# Patient Record
Sex: FEMALE | Race: AMERICAN INDIAN OR ALASKA NATIVE | NOT HISPANIC OR LATINO | Employment: UNEMPLOYED | URBAN - METROPOLITAN AREA
[De-identification: names, ages, dates, MRNs, and addresses within clinical notes are randomized per-mention and may not be internally consistent; named-entity substitution may affect disease eponyms.]

---

## 2018-10-19 ENCOUNTER — HOSPITAL ENCOUNTER (OUTPATIENT)
Dept: RADIOLOGY | Facility: HOSPITAL | Age: 43
Discharge: HOME | End: 2018-10-19
Attending: INTERNAL MEDICINE
Payer: MEDICAID

## 2018-10-19 ENCOUNTER — HOSPITAL ENCOUNTER (OUTPATIENT)
Dept: RADIOLOGY | Facility: HOSPITAL | Age: 43
Discharge: HOME | End: 2018-10-19
Payer: MEDICAID

## 2018-10-19 DIAGNOSIS — Z12.39 SCREENING BREAST EXAMINATION: Primary | ICD-10-CM

## 2018-10-19 DIAGNOSIS — R05.9 COUGH: Primary | ICD-10-CM

## 2018-10-19 DIAGNOSIS — R05.9 COUGH: ICD-10-CM

## 2018-10-19 PROCEDURE — 71046 X-RAY EXAM CHEST 2 VIEWS: CPT | Mod: 26,,, | Performed by: RADIOLOGY

## 2018-10-19 PROCEDURE — 71046 X-RAY EXAM CHEST 2 VIEWS: CPT | Mod: TC,FY

## 2018-10-19 PROCEDURE — 70220 X-RAY EXAM OF SINUSES: CPT | Mod: 26,,, | Performed by: RADIOLOGY

## 2018-10-19 PROCEDURE — 70220 X-RAY EXAM OF SINUSES: CPT | Mod: TC,FY

## 2018-10-24 ENCOUNTER — HOSPITAL ENCOUNTER (OUTPATIENT)
Dept: RADIOLOGY | Facility: HOSPITAL | Age: 43
Discharge: HOME | End: 2018-10-24
Payer: MEDICAID

## 2018-10-24 DIAGNOSIS — Z12.39 SCREENING BREAST EXAMINATION: ICD-10-CM

## 2018-10-24 PROCEDURE — 77063 BREAST TOMOSYNTHESIS BI: CPT | Mod: 26,,, | Performed by: RADIOLOGY

## 2018-10-24 PROCEDURE — 77063 BREAST TOMOSYNTHESIS BI: CPT | Mod: TC

## 2018-10-24 PROCEDURE — 77067 SCR MAMMO BI INCL CAD: CPT | Mod: 26,,, | Performed by: RADIOLOGY

## 2018-10-24 PROCEDURE — 77067 SCR MAMMO BI INCL CAD: CPT | Mod: TC

## 2018-11-12 ENCOUNTER — OFFICE VISIT (OUTPATIENT)
Dept: OBSTETRICS AND GYNECOLOGY | Facility: CLINIC | Age: 43
End: 2018-11-12
Payer: MEDICAID

## 2018-11-12 VITALS
HEIGHT: 65 IN | BODY MASS INDEX: 24.94 KG/M2 | SYSTOLIC BLOOD PRESSURE: 98 MMHG | WEIGHT: 149.69 LBS | DIASTOLIC BLOOD PRESSURE: 70 MMHG

## 2018-11-12 DIAGNOSIS — Z01.419 ENCOUNTER FOR GYNECOLOGICAL EXAMINATION WITHOUT ABNORMAL FINDING: Primary | ICD-10-CM

## 2018-11-12 DIAGNOSIS — Z12.4 CERVICAL CANCER SCREENING: ICD-10-CM

## 2018-11-12 PROCEDURE — 99999 PR PBB SHADOW E&M-EST. PATIENT-LVL III: CPT | Mod: PBBFAC,,, | Performed by: OBSTETRICS & GYNECOLOGY

## 2018-11-12 PROCEDURE — 99213 OFFICE O/P EST LOW 20 MIN: CPT | Mod: PBBFAC,PO | Performed by: OBSTETRICS & GYNECOLOGY

## 2018-11-12 PROCEDURE — 87624 HPV HI-RISK TYP POOLED RSLT: CPT

## 2018-11-12 PROCEDURE — 87491 CHLMYD TRACH DNA AMP PROBE: CPT

## 2018-11-12 PROCEDURE — 99386 PREV VISIT NEW AGE 40-64: CPT | Mod: S$PBB,,, | Performed by: OBSTETRICS & GYNECOLOGY

## 2018-11-12 PROCEDURE — 88175 CYTOPATH C/V AUTO FLUID REDO: CPT

## 2018-11-12 RX ORDER — MONTELUKAST SODIUM 10 MG/1
TABLET ORAL
Refills: 2 | COMMUNITY
Start: 2018-10-19 | End: 2022-04-06

## 2018-11-12 RX ORDER — FLUTICASONE PROPIONATE 50 MCG
SPRAY, SUSPENSION (ML) NASAL
Refills: 2 | COMMUNITY
Start: 2018-10-19 | End: 2022-04-06

## 2018-11-12 NOTE — LETTER
November 12, 2018      Herbert Abdi MD  [address] [facility] [place] [address]           [place] [facility]  [address], [address]  5th Floor [facility]  [place] [address]  Phone: [phone]          Patient: Wilmar Holcomb   MR Number: [id number]   YOB: 1975   Date of Visit: 11/12/2018       Dear Dr. Herbert Abdi:    Thank you for referring Wilmar Holcomb to me for evaluation. Attached you will find relevant portions of my assessment and plan of care.    If you have questions, please do not hesitate to call me. I look forward to following Wilmar Holcomb along with you.    Sincerely,    Desiree Cunningham MD    Enclosure  CC:  No Recipients    If you would like to receive this communication electronically, please contact [email or web address] or [phone] to request more information on [employer] access.    For providers and/or their staff who would like to refer a patient to [facility], please contact us through our one-stop-shop provider referral line, [facility], at [phone].    If you feel you have received this communication in error or would no longer like to receive these types of communications, please e-mail [email or web address]

## 2018-11-12 NOTE — PROGRESS NOTES
"44 yo now  female who presents for routine gyn visit.  Reports that her cycles come q month. Duration 4-5 days. No problems.  . Ok with STD testing today.  No h/o abl pap smears.  No h/o mammograms.  Has used OCPs in the past. They caused weight gain. Declines contraception today.    ROS:  GENERAL: Denies weight gain or weight loss. Feeling well overall.   SKIN: Denies rash or lesions.   CHEST: Denies chest pain or shortness of breath.   CARDIOVASCULAR: Denies palpitations or left sided chest pain.   ABDOMEN: No abdominal pain, constipation, diarrhea, nausea, vomiting or rectal bleeding.   URINARY: No frequency, dysuria, hematuria, or burning on urination.  REPRODUCTIVE: no complaints  BREASTS: denies pain, lumps, or nipple discharge.   HEMATOLOGIC: No easy bruisability or excessive bleeding.   MUSCULOSKELETAL: Denies joint pain or swelling.   NEUROLOGIC: Denies syncope or weakness.   PSYCHIATRIC: Denies depression, anxiety or mood swings.         PE:   Vitals: BP 98/70   Ht 5' 5" (1.651 m)   Wt 67.9 kg (149 lb 11.1 oz)   LMP 10/24/2018   BMI 24.91 kg/m²   APPEARANCE: Well nourished, well developed, in no acute distress.  SKIN: Normal skin turgor, no lesions.  CHEST: Lungs clear to auscultation.  HEART: Regular rate and rhythm, no murmurs, rubs or gallops.  ABDOMEN: Soft. No tenderness or masses. No hepatosplenomegaly. No hernias.  BREASTS: Symmetrical, no skin changes or visible lesions. No palpable masses, nipple discharge or adenopathy bilaterally.  PELVIC: Normal external female genitalia without lesions. Normal hair distribution. Adequate perineal body, normal urethral meatus. Vagina moist and well rugated without lesions or discharge. Cervix pink and without lesions. No significant cystocele or rectocele. Bimanual exam showed uterus normal size, shape, retroverted, mobile and nontender. Adnexa without masses or tenderness. Urethra and bladder normal.  EXTREMITIES: No clubbing cyanosis or " edema.    AP  Routine gyn  -s/p normal breast exam: mammogram uptodate  -s/p normal pelvic exam:   -Pap and HPV: collected  -STD testing: gc/chl and HIV ordered  -contraception: declined        EBENA Cunningham MD

## 2018-11-14 LAB
C TRACH DNA SPEC QL NAA+PROBE: NOT DETECTED
N GONORRHOEA DNA SPEC QL NAA+PROBE: NOT DETECTED

## 2018-11-16 LAB
HPV HR 12 DNA CVX QL NAA+PROBE: NEGATIVE
HPV16 AG SPEC QL: NEGATIVE
HPV18 DNA SPEC QL NAA+PROBE: NEGATIVE

## 2019-11-08 DIAGNOSIS — Z12.31 VISIT FOR SCREENING MAMMOGRAM: Primary | ICD-10-CM

## 2019-11-11 ENCOUNTER — IMMUNIZATION (OUTPATIENT)
Dept: PHARMACY | Facility: CLINIC | Age: 44
End: 2019-11-11
Payer: MEDICAID

## 2019-11-29 ENCOUNTER — HOSPITAL ENCOUNTER (OUTPATIENT)
Dept: RADIOLOGY | Facility: HOSPITAL | Age: 44
Discharge: HOME | End: 2019-11-29
Attending: INTERNAL MEDICINE
Payer: MEDICAID

## 2019-11-29 DIAGNOSIS — Z12.31 VISIT FOR SCREENING MAMMOGRAM: ICD-10-CM

## 2019-11-29 PROCEDURE — 77067 SCR MAMMO BI INCL CAD: CPT | Mod: TC

## 2019-11-29 PROCEDURE — 77067 MAMMO DIGITAL SCREENING BILAT WITH TOMOSYNTHESIS_CAD: ICD-10-PCS | Mod: 26,,, | Performed by: RADIOLOGY

## 2019-11-29 PROCEDURE — 77063 MAMMO DIGITAL SCREENING BILAT WITH TOMOSYNTHESIS_CAD: ICD-10-PCS | Mod: 26,,, | Performed by: RADIOLOGY

## 2019-11-29 PROCEDURE — 77063 BREAST TOMOSYNTHESIS BI: CPT | Mod: TC

## 2019-11-29 PROCEDURE — 77063 BREAST TOMOSYNTHESIS BI: CPT | Mod: 26,,, | Performed by: RADIOLOGY

## 2019-11-29 PROCEDURE — 77067 SCR MAMMO BI INCL CAD: CPT | Mod: 26,,, | Performed by: RADIOLOGY

## 2020-12-10 ENCOUNTER — HOSPITAL ENCOUNTER (OUTPATIENT)
Dept: RADIOLOGY | Facility: HOSPITAL | Age: 45
Discharge: HOME | End: 2020-12-10
Attending: INTERNAL MEDICINE
Payer: MEDICAID

## 2020-12-10 DIAGNOSIS — Z12.31 SCREENING MAMMOGRAM, ENCOUNTER FOR: ICD-10-CM

## 2020-12-10 DIAGNOSIS — R05.9 COUGH: ICD-10-CM

## 2020-12-10 DIAGNOSIS — R05.9 COUGH: Primary | ICD-10-CM

## 2020-12-10 PROCEDURE — 70220 X-RAY EXAM OF SINUSES: CPT | Mod: 26,,, | Performed by: RADIOLOGY

## 2020-12-10 PROCEDURE — 70220 X-RAY EXAM OF SINUSES: CPT | Mod: TC,FY

## 2020-12-10 PROCEDURE — 70220 XR SINUSES MIN 3 VIEWS: ICD-10-PCS | Mod: 26,,, | Performed by: RADIOLOGY

## 2020-12-23 ENCOUNTER — HOSPITAL ENCOUNTER (OUTPATIENT)
Dept: RADIOLOGY | Facility: HOSPITAL | Age: 45
Discharge: HOME | End: 2020-12-23
Attending: INTERNAL MEDICINE
Payer: MEDICAID

## 2020-12-23 DIAGNOSIS — Z12.31 SCREENING MAMMOGRAM, ENCOUNTER FOR: ICD-10-CM

## 2020-12-23 PROCEDURE — 77067 MAMMO DIGITAL SCREENING BILAT WITH TOMO: ICD-10-PCS | Mod: 26,,, | Performed by: RADIOLOGY

## 2020-12-23 PROCEDURE — 77067 SCR MAMMO BI INCL CAD: CPT | Mod: TC

## 2020-12-23 PROCEDURE — 77063 MAMMO DIGITAL SCREENING BILAT WITH TOMO: ICD-10-PCS | Mod: 26,,, | Performed by: RADIOLOGY

## 2020-12-23 PROCEDURE — 77063 BREAST TOMOSYNTHESIS BI: CPT | Mod: 26,,, | Performed by: RADIOLOGY

## 2020-12-23 PROCEDURE — 77067 SCR MAMMO BI INCL CAD: CPT | Mod: 26,,, | Performed by: RADIOLOGY

## 2021-01-14 ENCOUNTER — OFFICE VISIT (OUTPATIENT)
Dept: GASTROENTEROLOGY | Facility: CLINIC | Age: 46
End: 2021-01-14
Payer: MEDICAID

## 2021-01-14 ENCOUNTER — TELEPHONE (OUTPATIENT)
Dept: GASTROENTEROLOGY | Facility: CLINIC | Age: 46
End: 2021-01-14

## 2021-01-14 VITALS
OXYGEN SATURATION: 99 % | WEIGHT: 160.19 LBS | TEMPERATURE: 98 F | RESPIRATION RATE: 16 BRPM | BODY MASS INDEX: 26.69 KG/M2 | SYSTOLIC BLOOD PRESSURE: 112 MMHG | HEIGHT: 65 IN | HEART RATE: 73 BPM | DIASTOLIC BLOOD PRESSURE: 60 MMHG

## 2021-01-14 DIAGNOSIS — K59.04 CHRONIC IDIOPATHIC CONSTIPATION: Primary | ICD-10-CM

## 2021-01-14 PROCEDURE — 99999 PR PBB SHADOW E&M-EST. PATIENT-LVL IV: ICD-10-PCS | Mod: PBBFAC,,, | Performed by: NURSE PRACTITIONER

## 2021-01-14 PROCEDURE — 99203 OFFICE O/P NEW LOW 30 MIN: CPT | Mod: S$PBB,,, | Performed by: NURSE PRACTITIONER

## 2021-01-14 PROCEDURE — 99214 OFFICE O/P EST MOD 30 MIN: CPT | Mod: PBBFAC,PO | Performed by: NURSE PRACTITIONER

## 2021-01-14 PROCEDURE — 99999 PR PBB SHADOW E&M-EST. PATIENT-LVL IV: CPT | Mod: PBBFAC,,, | Performed by: NURSE PRACTITIONER

## 2021-01-14 PROCEDURE — 99203 PR OFFICE/OUTPT VISIT, NEW, LEVL III, 30-44 MIN: ICD-10-PCS | Mod: S$PBB,,, | Performed by: NURSE PRACTITIONER

## 2021-01-14 RX ORDER — POLYETHYLENE GLYCOL 3350 17 G/17G
17 POWDER, FOR SOLUTION ORAL DAILY
Qty: 510 G | Refills: 11 | Status: SHIPPED | OUTPATIENT
Start: 2021-01-14

## 2021-02-15 ENCOUNTER — OFFICE VISIT (OUTPATIENT)
Dept: GASTROENTEROLOGY | Facility: CLINIC | Age: 46
End: 2021-02-15
Payer: MEDICAID

## 2021-02-15 VITALS
WEIGHT: 161.19 LBS | TEMPERATURE: 98 F | HEIGHT: 65 IN | OXYGEN SATURATION: 98 % | HEART RATE: 69 BPM | RESPIRATION RATE: 16 BRPM | BODY MASS INDEX: 26.85 KG/M2 | DIASTOLIC BLOOD PRESSURE: 60 MMHG | SYSTOLIC BLOOD PRESSURE: 100 MMHG

## 2021-02-15 DIAGNOSIS — Z12.11 SCREENING FOR MALIGNANT NEOPLASM OF COLON: ICD-10-CM

## 2021-02-15 DIAGNOSIS — K59.04 CHRONIC IDIOPATHIC CONSTIPATION: Primary | ICD-10-CM

## 2021-02-15 DIAGNOSIS — Z01.818 PREOPERATIVE EXAMINATION: ICD-10-CM

## 2021-02-15 PROCEDURE — 99214 OFFICE O/P EST MOD 30 MIN: CPT | Mod: S$PBB,,, | Performed by: NURSE PRACTITIONER

## 2021-02-15 PROCEDURE — 99214 OFFICE O/P EST MOD 30 MIN: CPT | Mod: PBBFAC,PO | Performed by: NURSE PRACTITIONER

## 2021-02-15 PROCEDURE — 99999 PR PBB SHADOW E&M-EST. PATIENT-LVL IV: CPT | Mod: PBBFAC,,, | Performed by: NURSE PRACTITIONER

## 2021-02-15 PROCEDURE — 99999 PR PBB SHADOW E&M-EST. PATIENT-LVL IV: ICD-10-PCS | Mod: PBBFAC,,, | Performed by: NURSE PRACTITIONER

## 2021-02-15 PROCEDURE — 99214 PR OFFICE/OUTPT VISIT, EST, LEVL IV, 30-39 MIN: ICD-10-PCS | Mod: S$PBB,,, | Performed by: NURSE PRACTITIONER

## 2021-02-15 RX ORDER — SODIUM, POTASSIUM,MAG SULFATES 17.5-3.13G
1 SOLUTION, RECONSTITUTED, ORAL ORAL DAILY
Qty: 1 KIT | Refills: 0 | Status: SHIPPED | OUTPATIENT
Start: 2021-02-15 | End: 2021-02-17

## 2021-03-01 ENCOUNTER — LAB VISIT (OUTPATIENT)
Dept: FAMILY MEDICINE | Facility: CLINIC | Age: 46
End: 2021-03-01
Payer: MEDICAID

## 2021-03-01 DIAGNOSIS — Z01.818 PREOPERATIVE EXAMINATION: ICD-10-CM

## 2021-03-01 PROCEDURE — U0003 INFECTIOUS AGENT DETECTION BY NUCLEIC ACID (DNA OR RNA); SEVERE ACUTE RESPIRATORY SYNDROME CORONAVIRUS 2 (SARS-COV-2) (CORONAVIRUS DISEASE [COVID-19]), AMPLIFIED PROBE TECHNIQUE, MAKING USE OF HIGH THROUGHPUT TECHNOLOGIES AS DESCRIBED BY CMS-2020-01-R: HCPCS

## 2021-03-01 PROCEDURE — U0005 INFEC AGEN DETEC AMPLI PROBE: HCPCS

## 2021-03-02 LAB — SARS-COV-2 RNA RESP QL NAA+PROBE: NOT DETECTED

## 2021-03-04 PROBLEM — Z12.11 SCREEN FOR COLON CANCER: Status: ACTIVE | Noted: 2021-03-04

## 2021-07-09 ENCOUNTER — LAB VISIT (OUTPATIENT)
Dept: LAB | Facility: HOSPITAL | Age: 46
End: 2021-07-09
Payer: MEDICAID

## 2021-07-09 DIAGNOSIS — D64.9 ANEMIA, UNSPECIFIED: Primary | ICD-10-CM

## 2021-07-09 LAB
ABO + RH BLD: NORMAL
BLD GP AB SCN CELLS X3 SERPL QL: NORMAL

## 2021-07-09 PROCEDURE — 86900 BLOOD TYPING SEROLOGIC ABO: CPT | Performed by: INTERNAL MEDICINE

## 2021-07-09 PROCEDURE — 36415 COLL VENOUS BLD VENIPUNCTURE: CPT | Performed by: INTERNAL MEDICINE

## 2021-12-22 ENCOUNTER — HOSPITAL ENCOUNTER (OUTPATIENT)
Dept: RADIOLOGY | Facility: HOSPITAL | Age: 46
Discharge: HOME | End: 2021-12-22
Attending: INTERNAL MEDICINE
Payer: MEDICAID

## 2021-12-22 DIAGNOSIS — M79.641 RIGHT HAND PAIN: ICD-10-CM

## 2021-12-22 DIAGNOSIS — M79.641 RIGHT HAND PAIN: Primary | ICD-10-CM

## 2021-12-22 DIAGNOSIS — Z12.31 OTHER SCREENING MAMMOGRAM: Primary | ICD-10-CM

## 2021-12-22 PROCEDURE — 73130 XR HAND COMPLETE 3 VIEW RIGHT: ICD-10-PCS | Mod: 26,RT,, | Performed by: RADIOLOGY

## 2021-12-22 PROCEDURE — 73130 X-RAY EXAM OF HAND: CPT | Mod: TC,FY,RT

## 2021-12-22 PROCEDURE — 73130 X-RAY EXAM OF HAND: CPT | Mod: 26,RT,, | Performed by: RADIOLOGY

## 2022-02-02 ENCOUNTER — HOSPITAL ENCOUNTER (OUTPATIENT)
Dept: RADIOLOGY | Facility: HOSPITAL | Age: 47
Discharge: HOME | End: 2022-02-02
Attending: INTERNAL MEDICINE
Payer: MEDICAID

## 2022-02-02 DIAGNOSIS — Z12.31 OTHER SCREENING MAMMOGRAM: ICD-10-CM

## 2022-02-02 PROCEDURE — 77067 MAMMO DIGITAL SCREENING BILAT WITH TOMO: ICD-10-PCS | Mod: 26,,, | Performed by: RADIOLOGY

## 2022-02-02 PROCEDURE — 77067 SCR MAMMO BI INCL CAD: CPT | Mod: TC

## 2022-02-02 PROCEDURE — 77067 SCR MAMMO BI INCL CAD: CPT | Mod: 26,,, | Performed by: RADIOLOGY

## 2022-02-02 PROCEDURE — 77063 MAMMO DIGITAL SCREENING BILAT WITH TOMO: ICD-10-PCS | Mod: 26,,, | Performed by: RADIOLOGY

## 2022-02-02 PROCEDURE — 77063 BREAST TOMOSYNTHESIS BI: CPT | Mod: 26,,, | Performed by: RADIOLOGY

## 2022-03-17 ENCOUNTER — TELEPHONE (OUTPATIENT)
Dept: ORTHOPEDICS | Facility: CLINIC | Age: 47
End: 2022-03-17
Payer: MEDICAID

## 2022-03-17 DIAGNOSIS — M79.642 BILATERAL HAND PAIN: Primary | ICD-10-CM

## 2022-03-17 DIAGNOSIS — M79.641 BILATERAL HAND PAIN: Primary | ICD-10-CM

## 2022-03-17 NOTE — TELEPHONE ENCOUNTER
----- Message from Meera Mcbride PA-C sent at 3/17/2022  7:21 AM CDT -----  She has appt on Friday and needs bilateral hands XRs prior to seeing me.     Thanks.

## 2022-03-23 NOTE — PROGRESS NOTES
Subjective:      Patient ID: Wilmar Holcomb is a 47 y.o. female.    Chief Complaint: Pain of the Right Hand and Pain of the Left Hand      HPI  (Wilmer)    6 month history of intermittent bilateral hand pain/numbness/tingling that is worse at night and with lifting. She is right hand dominant. Right > left hand pain. She rates her pain as a 3 on a scale of 1-10. No alleviating factors. She feels like she has weakness in both hands.     No OT, surgery, injections, or bracing for her hands.       History reviewed. No pertinent past medical history.      Current Outpatient Medications:     polyethylene glycol (GLYCOLAX) 17 gram/dose powder, Take 17 g by mouth once daily., Disp: 510 g, Rfl: 11    Review of patient's allergies indicates:  No Known Allergies    Review of Systems   Constitutional: Negative for fever, malaise/fatigue, night sweats, weight gain and weight loss.   HENT: Negative for hearing loss, nosebleeds and odynophagia.    Eyes: Negative for blurred vision and double vision.   Cardiovascular: Negative for chest pain, irregular heartbeat and palpitations.   Respiratory: Negative for cough, hemoptysis, shortness of breath and wheezing.    Endocrine: Negative for cold intolerance and polydipsia.   Hematologic/Lymphatic: Does not bruise/bleed easily.   Skin: Negative for dry skin, poor wound healing, rash and suspicious lesions.   Musculoskeletal:        See HPI for pertinent positives.   Gastrointestinal: Negative for bloating, abdominal pain, constipation, diarrhea, hematochezia, melena, nausea and vomiting.   Genitourinary: Negative for bladder incontinence, dysuria, hematuria, hesitancy and incomplete emptying.   Neurological: Negative for disturbances in coordination, dizziness, focal weakness, headaches, loss of balance, numbness, paresthesias, seizures and weakness.   Psychiatric/Behavioral: Negative for depression and hallucinations. The patient is not nervous/anxious.          Objective:        Ht 5'  "5" (1.651 m)   Wt 68.5 kg (151 lb)   BMI 25.13 kg/m²     General    Vitals reviewed.  Constitutional: She is oriented to person, place, and time. She appears well-developed and well-nourished.   Pulmonary/Chest: Effort normal.   Abdominal: She exhibits no distension.   Neurological: She is alert and oriented to person, place, and time.   Psychiatric: She has a normal mood and affect. Her behavior is normal. Judgment and thought content normal.           RIGHT Hand/Wrist Examination:    Observation/Inspection:  Swelling  none    Deformity  none  Discoloration  none     Scars   none    Atrophy  none    HAND/WRIST EXAMINATION:  Finkelstein's Test   Neg  WHAT Test    Neg  Snuff box tenderness   Neg  Hook of Hamate Tenderness  Neg  CMC grind    Neg    Neurovascular Exam:  Digits WWP, brisk CR < 3s throughout  NVI motor/LTS to M/R/U nerves, radial pulse 2+  Tinel's Test - Carpal Tunnel  Neg  Tinel's Test - Cubital Tunnel  Neg  Phalen's Test    positive  Median Nerve Compression Test positive    ROM hand/wrist/elbow full, painless    She has mild swelling at DIP of index finger with mucous cyst.     Mild tenderness at IP joint of thumb.       LEFT Hand/Wrist Examination:    Observation/Inspection:  Swelling  none    Deformity  none  Discoloration  none     Scars   none    Atrophy  none    HAND/WRIST EXAMINATION:  Finkelstein's Test   Neg  WHAT Test    Neg  Snuff box tenderness   Neg  Hook of Hamate Tenderness  Neg  CMC grind    Neg    Neurovascular Exam:  Digits WWP, brisk CR < 3s throughout  NVI motor/LTS to M/R/U nerves, radial pulse 2+  Tinel's Test - Carpal Tunnel  Neg  Tinel's Test - Cubital Tunnel  Neg  Phalen's Test    positive  Median Nerve Compression Test positive    ROM hand/wrist/elbow full, painless    Mild tenderness IP joint of thumb.       XRAY INTERPRETATION:   X-rays of bilateral hands dated 3/23/22 are personally reviewed and show mild degeneration in IP joints, especially DIP of right index finger. "         Assessment:       Encounter Diagnoses   Name Primary?    Arthritis of hand     Numbness and tingling in both hands Yes          Plan:       Wilmar was seen today for pain and pain.    Diagnoses and all orders for this visit:    Numbness and tingling in both hands    Arthritis of hand      6 month history of intermittent bilateral hand pain/numbness/tingling that is worse at night and with lifting. She is right hand dominant. Right > left hand pain.     XRs show mild degeneration in IP joints, especially DIP of right index finger. She has mucous cyst at DIP joint of right index finger. Pain in fingers likely from underlying arthritis. Numbness/tingling/pain in hands likely from carpal tunnel.     Treatment options reviewed with patient along with above bilateral hand xrays. Following plan made:     - Given gel wrist brace x 2 to wear at night. Can wear during the day prn.   - Discussed trial of NSAID for arthritis. She wants to hold off.   - Discussed EMG/NCS, she wants to see how she does with braces first.   - Will call her in 4 weeks to check on her. If no improvement, then will get EMG/NCS.     Follow up if symptoms worsen or fail to improve.

## 2022-04-06 ENCOUNTER — OFFICE VISIT (OUTPATIENT)
Dept: ORTHOPEDICS | Facility: CLINIC | Age: 47
End: 2022-04-06
Payer: MEDICAID

## 2022-04-06 VITALS — BODY MASS INDEX: 25.16 KG/M2 | HEIGHT: 65 IN | WEIGHT: 151 LBS

## 2022-04-06 DIAGNOSIS — M19.049 ARTHRITIS OF HAND: ICD-10-CM

## 2022-04-06 DIAGNOSIS — R20.2 NUMBNESS AND TINGLING IN BOTH HANDS: Primary | ICD-10-CM

## 2022-04-06 DIAGNOSIS — R20.0 NUMBNESS AND TINGLING IN BOTH HANDS: Primary | ICD-10-CM

## 2022-04-06 PROCEDURE — 3008F PR BODY MASS INDEX (BMI) DOCUMENTED: ICD-10-PCS | Mod: CPTII,,, | Performed by: PHYSICIAN ASSISTANT

## 2022-04-06 PROCEDURE — 1160F RVW MEDS BY RX/DR IN RCRD: CPT | Mod: CPTII,,, | Performed by: PHYSICIAN ASSISTANT

## 2022-04-06 PROCEDURE — 1159F PR MEDICATION LIST DOCUMENTED IN MEDICAL RECORD: ICD-10-PCS | Mod: CPTII,,, | Performed by: PHYSICIAN ASSISTANT

## 2022-04-06 PROCEDURE — 99203 OFFICE O/P NEW LOW 30 MIN: CPT | Mod: S$PBB,,, | Performed by: PHYSICIAN ASSISTANT

## 2022-04-06 PROCEDURE — 3008F BODY MASS INDEX DOCD: CPT | Mod: CPTII,,, | Performed by: PHYSICIAN ASSISTANT

## 2022-04-06 PROCEDURE — 99203 PR OFFICE/OUTPT VISIT, NEW, LEVL III, 30-44 MIN: ICD-10-PCS | Mod: S$PBB,,, | Performed by: PHYSICIAN ASSISTANT

## 2022-04-06 PROCEDURE — 99213 OFFICE O/P EST LOW 20 MIN: CPT | Mod: PBBFAC,PN | Performed by: PHYSICIAN ASSISTANT

## 2022-04-06 PROCEDURE — 1160F PR REVIEW ALL MEDS BY PRESCRIBER/CLIN PHARMACIST DOCUMENTED: ICD-10-PCS | Mod: CPTII,,, | Performed by: PHYSICIAN ASSISTANT

## 2022-04-06 PROCEDURE — 99999 PR PBB SHADOW E&M-EST. PATIENT-LVL III: ICD-10-PCS | Mod: PBBFAC,,, | Performed by: PHYSICIAN ASSISTANT

## 2022-04-06 PROCEDURE — 1159F MED LIST DOCD IN RCRD: CPT | Mod: CPTII,,, | Performed by: PHYSICIAN ASSISTANT

## 2022-04-06 PROCEDURE — 99999 PR PBB SHADOW E&M-EST. PATIENT-LVL III: CPT | Mod: PBBFAC,,, | Performed by: PHYSICIAN ASSISTANT

## 2022-05-04 ENCOUNTER — TELEPHONE (OUTPATIENT)
Dept: ORTHOPEDICS | Facility: CLINIC | Age: 47
End: 2022-05-04
Payer: MEDICAID

## 2022-05-04 NOTE — TELEPHONE ENCOUNTER
Please call to see how she is doing- has she seen improvement with the wrist braces? If not, then I recommend a nerve test/EMG. Let me know and I will order it.

## 2022-05-04 NOTE — TELEPHONE ENCOUNTER
Spoke with pt to see how she is doing and if the braces are improving her pain. She states she is feeling  some improvement but not much.  I informed her that Meera recommends her to have nerve testing done if she is not having relief and that  She will put the order in and they will contact her to get that scheduled. Pt  Confirms verbal understanding.

## 2022-05-05 NOTE — TELEPHONE ENCOUNTER
Please fax last office note with EMG orders and facesheet to [facility]. Papers at [facility]'s desk. Make her a f/u with me in 4 weeks to review EMG.

## 2022-05-31 ENCOUNTER — TELEPHONE (OUTPATIENT)
Dept: ORTHOPEDICS | Facility: CLINIC | Age: 47
End: 2022-05-31
Payer: MEDICAID

## 2022-05-31 NOTE — TELEPHONE ENCOUNTER
Spoke with [facility] they stated pt has not been scheduled yet for EMG test, they will contact pt to get this scheduled.    ----- Message from Meera Mcbride PA-C sent at 5/31/2022 10:53 AM CDT -----  She has appt on Monday to review her EMG. I don't see results from [facility].     Please find out if this was done and get results or reschedule appt if needed.     Thanks.

## 2022-06-03 ENCOUNTER — OFFICE VISIT (OUTPATIENT)
Dept: URGENT CARE | Facility: CLINIC | Age: 47
End: 2022-06-03
Payer: MEDICAID

## 2022-06-03 VITALS
TEMPERATURE: 98 F | HEIGHT: 65 IN | WEIGHT: 151 LBS | BODY MASS INDEX: 25.16 KG/M2 | OXYGEN SATURATION: 99 % | SYSTOLIC BLOOD PRESSURE: 135 MMHG | RESPIRATION RATE: 18 BRPM | HEART RATE: 67 BPM | DIASTOLIC BLOOD PRESSURE: 78 MMHG

## 2022-06-03 DIAGNOSIS — J02.9 SORE THROAT: ICD-10-CM

## 2022-06-03 DIAGNOSIS — H69.92 ACUTE DYSFUNCTION OF LEFT EUSTACHIAN TUBE: ICD-10-CM

## 2022-06-03 DIAGNOSIS — J06.9 UPPER RESPIRATORY VIRUS: Primary | ICD-10-CM

## 2022-06-03 LAB
CTP QC/QA: YES
SARS-COV-2 RDRP RESP QL NAA+PROBE: NEGATIVE

## 2022-06-03 PROCEDURE — 1160F PR REVIEW ALL MEDS BY PRESCRIBER/CLIN PHARMACIST DOCUMENTED: ICD-10-PCS | Mod: CPTII,S$GLB,, | Performed by: PHYSICIAN ASSISTANT

## 2022-06-03 PROCEDURE — 1160F RVW MEDS BY RX/DR IN RCRD: CPT | Mod: CPTII,S$GLB,, | Performed by: PHYSICIAN ASSISTANT

## 2022-06-03 PROCEDURE — 3008F BODY MASS INDEX DOCD: CPT | Mod: CPTII,S$GLB,, | Performed by: PHYSICIAN ASSISTANT

## 2022-06-03 PROCEDURE — 3008F PR BODY MASS INDEX (BMI) DOCUMENTED: ICD-10-PCS | Mod: CPTII,S$GLB,, | Performed by: PHYSICIAN ASSISTANT

## 2022-06-03 PROCEDURE — 3078F PR MOST RECENT DIASTOLIC BLOOD PRESSURE < 80 MM HG: ICD-10-PCS | Mod: CPTII,S$GLB,, | Performed by: PHYSICIAN ASSISTANT

## 2022-06-03 PROCEDURE — 3078F DIAST BP <80 MM HG: CPT | Mod: CPTII,S$GLB,, | Performed by: PHYSICIAN ASSISTANT

## 2022-06-03 PROCEDURE — 3075F SYST BP GE 130 - 139MM HG: CPT | Mod: CPTII,S$GLB,, | Performed by: PHYSICIAN ASSISTANT

## 2022-06-03 PROCEDURE — U0002: ICD-10-PCS | Mod: QW,S$GLB,, | Performed by: PHYSICIAN ASSISTANT

## 2022-06-03 PROCEDURE — 1159F PR MEDICATION LIST DOCUMENTED IN MEDICAL RECORD: ICD-10-PCS | Mod: CPTII,S$GLB,, | Performed by: PHYSICIAN ASSISTANT

## 2022-06-03 PROCEDURE — 3075F PR MOST RECENT SYSTOLIC BLOOD PRESS GE 130-139MM HG: ICD-10-PCS | Mod: CPTII,S$GLB,, | Performed by: PHYSICIAN ASSISTANT

## 2022-06-03 PROCEDURE — 99203 OFFICE O/P NEW LOW 30 MIN: CPT | Mod: S$GLB,,, | Performed by: PHYSICIAN ASSISTANT

## 2022-06-03 PROCEDURE — 99203 PR OFFICE/OUTPT VISIT, NEW, LEVL III, 30-44 MIN: ICD-10-PCS | Mod: S$GLB,,, | Performed by: PHYSICIAN ASSISTANT

## 2022-06-03 PROCEDURE — 1159F MED LIST DOCD IN RCRD: CPT | Mod: CPTII,S$GLB,, | Performed by: PHYSICIAN ASSISTANT

## 2022-06-03 PROCEDURE — U0002 COVID-19 LAB TEST NON-CDC: HCPCS | Mod: QW,S$GLB,, | Performed by: PHYSICIAN ASSISTANT

## 2022-06-03 RX ORDER — CETIRIZINE HYDROCHLORIDE 10 MG/1
10 TABLET ORAL DAILY
Qty: 30 TABLET | Refills: 0 | Status: SHIPPED | OUTPATIENT
Start: 2022-06-03 | End: 2022-07-03

## 2022-06-03 RX ORDER — KETOROLAC TROMETHAMINE 30 MG/ML
30 INJECTION, SOLUTION INTRAMUSCULAR; INTRAVENOUS
Status: COMPLETED | OUTPATIENT
Start: 2022-06-03 | End: 2022-06-03

## 2022-06-03 RX ORDER — FLUTICASONE PROPIONATE 50 MCG
1 SPRAY, SUSPENSION (ML) NASAL DAILY
Qty: 18.2 ML | Refills: 0 | Status: SHIPPED | OUTPATIENT
Start: 2022-06-03

## 2022-06-03 RX ORDER — LINACLOTIDE 72 UG/1
72 CAPSULE, GELATIN COATED ORAL EVERY MORNING
COMMUNITY
Start: 2022-05-23

## 2022-06-03 RX ORDER — MONTELUKAST SODIUM 10 MG/1
10 TABLET ORAL DAILY
COMMUNITY
Start: 2022-05-23

## 2022-06-03 RX ORDER — FLUTICASONE PROPIONATE 50 MCG
SPRAY, SUSPENSION (ML) NASAL
COMMUNITY
Start: 2022-05-23

## 2022-06-03 RX ADMIN — KETOROLAC TROMETHAMINE 30 MG: 30 INJECTION, SOLUTION INTRAMUSCULAR; INTRAVENOUS at 06:06

## 2022-06-03 NOTE — PATIENT INSTRUCTIONS
PLEASE READ YOUR DISCHARGE INSTRUCTIONS ENTIRELY AS IT CONTAINS IMPORTANT INFORMATION.  You received an injection of a powerful NSAID today (Toradol).  Its effects will last up to 24 hours.  Please do not take another NSAID (ie aspirin, ibuprofen, Aleve, Advil or Motrin) until this time tomorrow.  If you continue to have pain, you may take Tylenol (acetaminophen) if you are not allergic to this medication.      - Rest.    - Drink plenty of fluids.    - Tylenol or Ibuprofen as directed as needed for fever/pain.    - If you were prescribed antibiotics, please take them to completion.  - If you are female and on birth control pills - please use additional methods of contraception to prevent pregnancy while on antibiotics and for one cycle after.   - If you were prescribed a narcotic medication, a cough syrup, or a muscle relaxer, do not drive or operate heavy equipment or machinery while taking these medications, as they can cause drowsiness.   - If a referral to a specialty was made today, you should receive a phone call in the next few days to schedule an appt.  Please call [phone] to schedule an appt if have not gotten a phone call in the next few days.  - If you smoke, please stop smoking.  -You must understand that you've received an Urgent Care treatment only and that you may be released before all your medical problems are known or treated. You, the patient, will arrange for follow up care as instructed. Please arrange follow up with your primary medical clinic as soon as possible.   - Follow up with your PCP or specialty clinic as directed in the next 1-2 weeks if not improved or as needed.  You can call [phone] to schedule an appointment with the appropriate provider.    - Please return to Urgent Care or to the Emergency Department if your symptoms worsen.    Patient aware and verbalized understanding.

## 2022-06-03 NOTE — PROGRESS NOTES
"Subjective:       Patient ID: Wilmar Holcomb is a 47 y.o. female.    Vitals:  height is 5' 5" (1.651 m) and weight is 68.5 kg (151 lb). Her oral temperature is 98.3 °F (36.8 °C). Her blood pressure is 135/78 and her pulse is 67. Her respiration is 18 and oxygen saturation is 99%.     Chief Complaint: URI    Ms. Holcomb presents for evaluation of left ear pain and sore throat that started Monday.  She has not had any sick exposures.  She denies any fevers, chills, cough, headache, congestion, shortness of breath, chest pain, leg swelling, nausea, vomiting, diarrhea, anosmia or ageusia.  She has taken tylenol and ibuprofen for symptoms with some relief.       URI   This is a new problem. The current episode started in the past 7 days. The problem has been gradually worsening. There has been no fever. Associated symptoms include ear pain and a sore throat. Pertinent negatives include no abdominal pain, chest pain, congestion, coughing, diarrhea, dysuria, headaches, nausea, rash, sinus pain, sneezing, vomiting or wheezing. She has tried acetaminophen and antihistamine for the symptoms. The treatment provided no relief.       Constitution: Negative for appetite change, chills, sweating, fatigue and fever.   HENT: Positive for ear pain and sore throat. Negative for ear discharge, hearing loss, drooling, congestion, postnasal drip, sinus pain and sinus pressure.    Neck: Negative for neck stiffness and painful lymph nodes.   Cardiovascular: Negative for chest trauma, chest pain, leg swelling, palpitations, sob on exertion and passing out.   Eyes: Negative for eye pain and blurred vision.   Respiratory: Negative for chest tightness, cough, sputum production, shortness of breath and wheezing.    Gastrointestinal: Negative for abdominal pain, nausea, vomiting and diarrhea.   Genitourinary: Negative for dysuria, frequency and urgency.   Musculoskeletal: Negative for joint pain, joint swelling, muscle cramps and muscle ache.   Skin: " Negative for rash.   Allergic/Immunologic: Negative for itching and sneezing.   Neurological: Negative for dizziness, history of vertigo, light-headedness, passing out, facial drooping, speech difficulty, coordination disturbances, loss of balance, headaches and altered mental status.   Hematologic/Lymphatic: Negative for swollen lymph nodes and easy bruising/bleeding. Does not bruise/bleed easily.   Psychiatric/Behavioral: Negative for altered mental status.       Objective:      Physical Exam   Constitutional: She is oriented to person, place, and time. She appears well-developed. She is cooperative.  Non-toxic appearance. She does not appear ill. No distress.   HENT:   Head: Normocephalic and atraumatic.   Ears:   Right Ear: Hearing, tympanic membrane, external ear and ear canal normal. Tympanic membrane is not perforated and not erythematous. No middle ear effusion.   Left Ear: Hearing, tympanic membrane, external ear and ear canal normal. Tympanic membrane is not perforated and not erythematous.  No middle ear effusion.   Nose: Nose normal. No mucosal edema, rhinorrhea or nasal deformity. No epistaxis. Right sinus exhibits no maxillary sinus tenderness and no frontal sinus tenderness. Left sinus exhibits no maxillary sinus tenderness and no frontal sinus tenderness.   Mouth/Throat: Uvula is midline, oropharynx is clear and moist and mucous membranes are normal. No trismus in the jaw. Normal dentition. No uvula swelling. No oropharyngeal exudate, posterior oropharyngeal edema or posterior oropharyngeal erythema. No tonsillar exudate.   Eyes: Conjunctivae and lids are normal. No scleral icterus.   Neck: Trachea normal and phonation normal. Neck supple. No edema present. No erythema present. No neck rigidity present.   Cardiovascular: Normal rate, regular rhythm, normal heart sounds and normal pulses.   Pulmonary/Chest: Effort normal and breath sounds normal. No stridor. No respiratory distress. She has no  decreased breath sounds. She has no wheezes. She has no rhonchi. She has no rales.   Abdominal: Normal appearance.   Musculoskeletal: Normal range of motion.         General: No deformity. Normal range of motion.   Lymphadenopathy:     She has no cervical adenopathy.   Neurological: She is alert and oriented to person, place, and time. She exhibits normal muscle tone. Coordination normal.   Skin: Skin is warm, dry, intact, not diaphoretic and not pale.   Psychiatric: Her speech is normal and behavior is normal. Judgment and thought content normal.   Nursing note and vitals reviewed.          Results for orders placed or performed in visit on 06/03/22   POCT COVID-19 Rapid Screening   Result Value Ref Range    POC Rapid COVID Negative Negative     Acceptable Yes        Assessment:       1. Upper respiratory virus    2. Sore throat          Plan:         Upper respiratory virus    Sore throat  -     POCT COVID-19 Rapid Screening    Other orders  -     fluticasone propionate (FLONASE) 50 mcg/actuation nasal spray; 1 spray (50 mcg total) by Each Nostril route once daily.  Dispense: 18.2 mL; Refill: 0  -     cetirizine (ZYRTEC) 10 MG tablet; Take 1 tablet (10 mg total) by mouth once daily.  Dispense: 30 tablet; Refill: 0  -     (Magic mouthwash) 1:1:1 diphenhydramine(Benadryl) 12.5mg/5ml liq, aluminum & magnesium hydroxide-simethicone (Maalox), LIDOcaine viscous 2%; Swish and spit 10 mLs every 4 (four) hours as needed (sore throat). for mouth sores  Dispense: 360 mL; Refill: 0  -     ketorolac injection 30 mg    Diagnoses and plan discussed with the patient, as well as the expected course and duration of her symptoms. All questions and concerns were addressed prior to discharge.  She was advised to follow up with her PCP within 1 week if symptoms do not improve. Emergency department precautions were given. Patient verbalized understanding and was happy with the plan of care.   Note dictated with voice  recognition software, please excuse any grammatical errors.    Patient Instructions   PLEASE READ YOUR DISCHARGE INSTRUCTIONS ENTIRELY AS IT CONTAINS IMPORTANT INFORMATION.  You received an injection of a powerful NSAID today (Toradol).  Its effects will last up to 24 hours.  Please do not take another NSAID (ie aspirin, ibuprofen, Aleve, Advil or Motrin) until this time tomorrow.  If you continue to have pain, you may take Tylenol (acetaminophen) if you are not allergic to this medication.      - Rest.    - Drink plenty of fluids.    - Tylenol or Ibuprofen as directed as needed for fever/pain.    - If you were prescribed antibiotics, please take them to completion.  - If you are female and on birth control pills - please use additional methods of contraception to prevent pregnancy while on antibiotics and for one cycle after.   - If you were prescribed a narcotic medication, a cough syrup, or a muscle relaxer, do not drive or operate heavy equipment or machinery while taking these medications, as they can cause drowsiness.   - If a referral to a specialty was made today, you should receive a phone call in the next few days to schedule an appt.  Please call [phone] to schedule an appt if have not gotten a phone call in the next few days.  - If you smoke, please stop smoking.  -You must understand that you've received an Urgent Care treatment only and that you may be released before all your medical problems are known or treated. You, the patient, will arrange for follow up care as instructed. Please arrange follow up with your primary medical clinic as soon as possible.   - Follow up with your PCP or specialty clinic as directed in the next 1-2 weeks if not improved or as needed.  You can call [phone] to schedule an appointment with the appropriate provider.    - Please return to Urgent Care or to the Emergency Department if your symptoms worsen.    Patient aware and verbalized understanding.

## 2022-07-13 ENCOUNTER — OFFICE VISIT (OUTPATIENT)
Dept: URGENT CARE | Facility: CLINIC | Age: 47
End: 2022-07-13
Payer: MEDICAID

## 2022-07-13 VITALS
SYSTOLIC BLOOD PRESSURE: 128 MMHG | WEIGHT: 151 LBS | OXYGEN SATURATION: 99 % | HEIGHT: 65 IN | HEART RATE: 90 BPM | DIASTOLIC BLOOD PRESSURE: 80 MMHG | TEMPERATURE: 99 F | RESPIRATION RATE: 18 BRPM | BODY MASS INDEX: 25.16 KG/M2

## 2022-07-13 DIAGNOSIS — U07.1 COVID-19 VIRUS INFECTION: Primary | ICD-10-CM

## 2022-07-13 LAB
CTP QC/QA: YES
SARS-COV-2 RDRP RESP QL NAA+PROBE: POSITIVE

## 2022-07-13 PROCEDURE — 1159F MED LIST DOCD IN RCRD: CPT | Mod: CPTII,S$GLB,, | Performed by: NURSE PRACTITIONER

## 2022-07-13 PROCEDURE — 3079F DIAST BP 80-89 MM HG: CPT | Mod: CPTII,S$GLB,, | Performed by: NURSE PRACTITIONER

## 2022-07-13 PROCEDURE — 3074F PR MOST RECENT SYSTOLIC BLOOD PRESSURE < 130 MM HG: ICD-10-PCS | Mod: CPTII,S$GLB,, | Performed by: NURSE PRACTITIONER

## 2022-07-13 PROCEDURE — 3079F PR MOST RECENT DIASTOLIC BLOOD PRESSURE 80-89 MM HG: ICD-10-PCS | Mod: CPTII,S$GLB,, | Performed by: NURSE PRACTITIONER

## 2022-07-13 PROCEDURE — 3008F PR BODY MASS INDEX (BMI) DOCUMENTED: ICD-10-PCS | Mod: CPTII,S$GLB,, | Performed by: NURSE PRACTITIONER

## 2022-07-13 PROCEDURE — 1160F RVW MEDS BY RX/DR IN RCRD: CPT | Mod: CPTII,S$GLB,, | Performed by: NURSE PRACTITIONER

## 2022-07-13 PROCEDURE — 1160F PR REVIEW ALL MEDS BY PRESCRIBER/CLIN PHARMACIST DOCUMENTED: ICD-10-PCS | Mod: CPTII,S$GLB,, | Performed by: NURSE PRACTITIONER

## 2022-07-13 PROCEDURE — 1159F PR MEDICATION LIST DOCUMENTED IN MEDICAL RECORD: ICD-10-PCS | Mod: CPTII,S$GLB,, | Performed by: NURSE PRACTITIONER

## 2022-07-13 PROCEDURE — 3008F BODY MASS INDEX DOCD: CPT | Mod: CPTII,S$GLB,, | Performed by: NURSE PRACTITIONER

## 2022-07-13 PROCEDURE — U0002 COVID-19 LAB TEST NON-CDC: HCPCS | Mod: QW,S$GLB,, | Performed by: NURSE PRACTITIONER

## 2022-07-13 PROCEDURE — 99213 PR OFFICE/OUTPT VISIT, EST, LEVL III, 20-29 MIN: ICD-10-PCS | Mod: S$GLB,,, | Performed by: NURSE PRACTITIONER

## 2022-07-13 PROCEDURE — 3074F SYST BP LT 130 MM HG: CPT | Mod: CPTII,S$GLB,, | Performed by: NURSE PRACTITIONER

## 2022-07-13 PROCEDURE — U0002: ICD-10-PCS | Mod: QW,S$GLB,, | Performed by: NURSE PRACTITIONER

## 2022-07-13 PROCEDURE — 99213 OFFICE O/P EST LOW 20 MIN: CPT | Mod: S$GLB,,, | Performed by: NURSE PRACTITIONER

## 2022-07-13 NOTE — PROGRESS NOTES
"Subjective:       Patient ID: Wilmar Holcomb is a 47 y.o. female.    Vitals:  height is 5' 5" (1.651 m) and weight is 68.5 kg (151 lb). Her temperature is 98.6 °F (37 °C). Her blood pressure is 128/80 and her pulse is 90. Her respiration is 18 and oxygen saturation is 99%.     Chief Complaint: URI    This is a 47 y.o. female who presents today with a chief complaint of URI, sinus pain, sore throat and headache started today, denies fever, body aches or chills, denies cough, wheezing or shortness of breath, denies nausea, vomiting, diarrhea or abdominal pain, denies chest pain or dizziness positional lightheadedness, denies  trouble swallowing, denies loss of taste or smell, or any other symptoms      URI   This is a new problem. The current episode started today. The problem has been gradually worsening. Associated symptoms include headaches, joint pain, sinus pain and a sore throat. Pertinent negatives include no abdominal pain, chest pain, coughing, nausea, vomiting or wheezing. Associated symptoms comments: Runny nose..       Constitution: Negative for chills, sweating, fatigue and fever.   HENT: Positive for sinus pain and sore throat.    Cardiovascular: Negative for chest pain.   Respiratory: Negative for chest tightness, cough, sputum production, shortness of breath and wheezing.    Gastrointestinal: Negative for abdominal pain, nausea, vomiting and constipation.   Allergic/Immunologic: Negative for environmental allergies and seasonal allergies.   Neurological: Positive for headaches. Negative for dizziness and light-headedness.       Objective:      Physical Exam   Constitutional: She is oriented to person, place, and time. She appears well-developed. She is cooperative.  Non-toxic appearance. She does not appear ill. No distress.   HENT:   Head: Normocephalic and atraumatic.   Ears:   Right Ear: Hearing, tympanic membrane, external ear and ear canal normal.   Left Ear: Hearing, tympanic membrane, external ear " and ear canal normal.   Nose: Nose normal. No mucosal edema, rhinorrhea or nasal deformity. No epistaxis. Right sinus exhibits no maxillary sinus tenderness and no frontal sinus tenderness. Left sinus exhibits no maxillary sinus tenderness and no frontal sinus tenderness.   Mouth/Throat: Uvula is midline, oropharynx is clear and moist and mucous membranes are normal. No trismus in the jaw. Normal dentition. No uvula swelling. No oropharyngeal exudate, posterior oropharyngeal edema or posterior oropharyngeal erythema.   Eyes: Conjunctivae and lids are normal. No scleral icterus. Extraocular movement intact   Neck: Trachea normal and phonation normal. Neck supple. No edema present. No erythema present. No neck rigidity present.   Cardiovascular: Normal rate, regular rhythm, normal heart sounds and normal pulses.   Pulmonary/Chest: Effort normal and breath sounds normal. No stridor. No respiratory distress. She has no decreased breath sounds. She has no wheezes. She has no rhonchi. She has no rales.   Abdominal: Normal appearance.   Musculoskeletal: Normal range of motion.         General: No deformity. Normal range of motion.   Neurological: no focal deficit. She is alert and oriented to person, place, and time. She exhibits normal muscle tone. Coordination normal.   Skin: Skin is warm, dry, intact, not diaphoretic and not pale.   Psychiatric: Her speech is normal and behavior is normal. Judgment and thought content normal.   Nursing note and vitals reviewed.    Results for orders placed or performed in visit on 07/13/22   POCT COVID-19 Rapid Screening   Result Value Ref Range    POC Rapid COVID Positive (A) Negative     Acceptable Yes          Patient in no acute distress.  Vitals reassuring.Risk of complication score 0.  Positive COVID 19 results discussed with patient in detail.  Detailed education provided about COVID 19 precautions and recommendations as per CDC guidelines.  Over-the-counter  medications discussed.  Red flags discussed in detail.    Discussed results/diagnosis/plan in depth with patient in clinic. Strict precautions given to patient to monitor for worsening signs and symptoms. Advised to follow up with primary.All questions answered. Strict ER precautions given. If your symptoms worsens or fail to improve you should go to the Emergency Room. Discharge and follow-up instructions given verbally/printed. Discharge and follow-up instructions discussed with the patient who expressed understanding and willingness to comply with my recommendations.Patient voiced understanding and in agreement with current treatment plan.     Please be advised this text was dictated with [employer] software and may contain errors due to translation.          Assessment:       1. COVID-19 virus infection          Plan:         COVID-19 virus infection  -     POCT COVID-19 Rapid Screening                 Patient Instructions     You have tested positive for COVID-19 today.        ISOLATION    If you tested positive and do not have symptoms, you must isolate for 5 days starting on the day of the positive test. I    If you tested positive and have symptoms, you must isolate for 5 days starting on the day of the first symptoms,  not the day of the positive test.    This is the most important part, both the CDC and the [facility] emphasize that you do not test out of isolation.    After 5 days, if your symptoms have improved and you have not had fever on day 5, you can return to the community on day 6- NO TESTING REQUIRED!     In fact, we do not retest if you were positive in the last 90 days.    After your 5 days of isolation are completed, the CDC recommends strict mask use for the first 5 days that you come out of isolation    CDC Testing and Quarantine Guidelines for patients with exposure to a known-positive COVID-19 person:    ·     A 'close exposure' is defined as anyone who has had an exposure (masked or unmasked) to a  known COVID -19 positive person            within 6 feet of someone            for a cumulative total of 15 minutes or more over a 24-hour period.    ·     vaccinated Have been boosted or completed the primary series of Pfizer or Moderna vaccine within the last 6 months or completed the primary series of J&J vaccine within the last 2 months and/or had a positive test within 90 days            do NOT need to quarantine after contact with someone who had COVID-19 unless they have symptoms.            fully vaccinated people who have not had a positive test within 90 days, should get tested 3-5 days after their exposure, even if they don't have symptoms and wear a mask indoors in public for 10 days following exposure or until their test result is negative on day 5.            If you develop symptoms test and quarantine.      ·     Unvaccinated, or are more than six months out from their second mRNA dose (or more than 2 months after the J&J vaccine) and not yet boosted,  and/or NOT had a positive test within 90 days and meet 'close exposure'    you are required by CDC guidelines to quarantine for at least 5 days from time of exposure followed by 5 days of strict masking. It is recommended, but not required to test after 5 days, unless you develop symptoms, in which case you should test at that time.    If you do decide to test at 5 days and are asymptomatic, the risk is that if you test without symptoms on Day 5 for example) and you are positive, your 5 day isolation begins on that day, and you turned your 5 day quarantine into 10 days.            If your exposure does not meet the above definition, you can return to your normal daily activities to include social distancing, wearing a mask and frequent handwashing.    Alternatively, if a 5-day quarantine is not feasible, it is imperative that an exposed person wear a well-fitting mask at all times when around others for 10 days after exposure.           PLEASE READ YOUR  DISCHARGE INSTRUCTIONS ENTIRELY AS IT CONTAINS IMPORTANT INFORMATION.      Please drink plenty of fluids.    Please get plenty of rest.    Please return here or go to the Emergency Department for any concerns or worsening of condition.    Please take an over the counter antihistamine medication (allegra/Claritin/Zyrtec) of your choice as directed.    Try an over the counter decongestant like Mucinex D or Sudafed. You buy this behind the pharmacy counter    If you do have Hypertension or palpitations, it is safe to take Coricidin HBP for relief of sinus symptoms.    If not allergic, please take over the counter Tylenol (Acetaminophen) and/or Motrin (Ibuprofen) as directed for control of pain and/or fever.  Please follow up with your primary care doctor or specialist as needed.    Sore throat recommendations: Warm fluids, warm salt water gargles, throat lozenges, tea, honey, soup, rest, hydration.    Use over the counter flonase: one spray each nostril twice daily OR two sprays each nostril once daily.     If you  smoke, please stop smoking.      Please return or see your primary care doctor if you develop new or worsening symptoms.     Please arrange follow up with your primary medical clinic as soon as possible. You must understand that you've received an Urgent Care treatment only and that you may be released before all of your medical problems are known or treated. You, the patient, will arrange for follow up as instructed. If your symptoms worsen or fail to improve you should go to the Emergency Room.

## 2022-07-13 NOTE — PATIENT INSTRUCTIONS
You have tested positive for COVID-19 today.        ISOLATION    If you tested positive and do not have symptoms, you must isolate for 5 days starting on the day of the positive test. I    If you tested positive and have symptoms, you must isolate for 5 days starting on the day of the first symptoms,  not the day of the positive test.    This is the most important part, both the CDC and the [facility] emphasize that you do not test out of isolation.    After 5 days, if your symptoms have improved and you have not had fever on day 5, you can return to the community on day 6- NO TESTING REQUIRED!     In fact, we do not retest if you were positive in the last 90 days.    After your 5 days of isolation are completed, the CDC recommends strict mask use for the first 5 days that you come out of isolation    CDC Testing and Quarantine Guidelines for patients with exposure to a known-positive COVID-19 person:    ·     A 'close exposure' is defined as anyone who has had an exposure (masked or unmasked) to a known COVID -19 positive person            within 6 feet of someone            for a cumulative total of 15 minutes or more over a 24-hour period.    ·     vaccinated Have been boosted or completed the primary series of Pfizer or Moderna vaccine within the last 6 months or completed the primary series of J&J vaccine within the last 2 months and/or had a positive test within 90 days            do NOT need to quarantine after contact with someone who had COVID-19 unless they have symptoms.            fully vaccinated people who have not had a positive test within 90 days, should get tested 3-5 days after their exposure, even if they don't have symptoms and wear a mask indoors in public for 10 days following exposure or until their test result is negative on day 5.            If you develop symptoms test and quarantine.      ·     Unvaccinated, or are more than six months out from their second mRNA dose (or more than 2 months  after the J&J vaccine) and not yet boosted,  and/or NOT had a positive test within 90 days and meet 'close exposure'    you are required by CDC guidelines to quarantine for at least 5 days from time of exposure followed by 5 days of strict masking. It is recommended, but not required to test after 5 days, unless you develop symptoms, in which case you should test at that time.    If you do decide to test at 5 days and are asymptomatic, the risk is that if you test without symptoms on Day 5 for example) and you are positive, your 5 day isolation begins on that day, and you turned your 5 day quarantine into 10 days.            If your exposure does not meet the above definition, you can return to your normal daily activities to include social distancing, wearing a mask and frequent handwashing.    Alternatively, if a 5-day quarantine is not feasible, it is imperative that an exposed person wear a well-fitting mask at all times when around others for 10 days after exposure.           PLEASE READ YOUR DISCHARGE INSTRUCTIONS ENTIRELY AS IT CONTAINS IMPORTANT INFORMATION.      Please drink plenty of fluids.    Please get plenty of rest.    Please return here or go to the Emergency Department for any concerns or worsening of condition.    Please take an over the counter antihistamine medication (allegra/Claritin/Zyrtec) of your choice as directed.    Try an over the counter decongestant like Mucinex D or Sudafed. You buy this behind the pharmacy counter    If you do have Hypertension or palpitations, it is safe to take Coricidin HBP for relief of sinus symptoms.    If not allergic, please take over the counter Tylenol (Acetaminophen) and/or Motrin (Ibuprofen) as directed for control of pain and/or fever.  Please follow up with your primary care doctor or specialist as needed.    Sore throat recommendations: Warm fluids, warm salt water gargles, throat lozenges, tea, honey, soup, rest, hydration.    Use over the counter  flonase: one spray each nostril twice daily OR two sprays each nostril once daily.     If you  smoke, please stop smoking.      Please return or see your primary care doctor if you develop new or worsening symptoms.     Please arrange follow up with your primary medical clinic as soon as possible. You must understand that you've received an Urgent Care treatment only and that you may be released before all of your medical problems are known or treated. You, the patient, will arrange for follow up as instructed. If your symptoms worsen or fail to improve you should go to the Emergency Room.

## 2022-07-29 NOTE — PROGRESS NOTES
Subjective:      Patient ID: Wilmar Holcomb is a 47 y.o. female.    Chief Complaint: Pain of the Right Hand      HPI  ([place])    She was last seen by me on 4/6/22 for bilateral hand numbness/tingling/pain. 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 She has mucous cyst at DIP joint of right index finger.     She was given wrist braces at last visit and is here to review EMG results.     She notes improvement in her symptoms since she has been out of work ([employer]) for the last 10 days. She has intermittent numbness/tingling/pain in right > left hand. Some improvement with wrist braces. She rates her pain as a 5 on a scale of 1-10. No OT, surgery, or injections for her hands.       History reviewed. No pertinent past medical history.      Current Outpatient Medications:     fluticasone propionate (FLONASE) 50 mcg/actuation nasal spray, by Each Nostril route., Disp: , Rfl:     fluticasone propionate (FLONASE) 50 mcg/actuation nasal spray, 1 spray (50 mcg total) by Each Nostril route once daily., Disp: 18.2 mL, Rfl: 0    LINZESS 72 mcg Cap capsule, Take 72 mcg by mouth every morning., Disp: , Rfl:     montelukast (SINGULAIR) 10 mg tablet, Take 10 mg by mouth once daily., Disp: , Rfl:     polyethylene glycol (GLYCOLAX) 17 gram/dose powder, Take 17 g by mouth once daily., Disp: 510 g, Rfl: 11    cetirizine (ZYRTEC) 10 MG tablet, Take 1 tablet (10 mg total) by mouth once daily., Disp: 30 tablet, Rfl: 0    Review of patient's allergies indicates:  No Known Allergies    Review of Systems   Constitutional: Negative for chills, fever, night sweats and weight gain.   Gastrointestinal: Negative for bowel incontinence, nausea and vomiting.   Genitourinary: Negative for bladder incontinence.   Neurological: Negative for disturbances in coordination and loss of balance.         Objective:        Wt 71.6 kg (157 lb 12.8 oz)   LMP  (LMP Unknown)   BMI 26.26 kg/m²     Ortho/SPM Exam      RIGHT Hand/Wrist Examination:    Observation/Inspection:  Swelling  none    Deformity  none  Discoloration  none     Scars   none    Atrophy  none    HAND/WRIST EXAMINATION:  Finkelstein's Test   Neg  WHAT Test    Neg  Snuff box tenderness   Neg  Hook of Hamate Tenderness  Neg  CMC grind    Neg    Neurovascular Exam:  Digits WWP, brisk CR < 3s throughout  NVI motor/LTS to M/R/U nerves, radial pulse 2+  Tinel's Test - Carpal Tunnel  Neg  Tinel's Test - Cubital Tunnel  Neg  Phalen's Test    positive  Median Nerve Compression Test positive    ROM hand/wrist/elbow full, painless    She has mild swelling at DIP of index finger with mucous cyst.       LEFT Hand/Wrist Examination:    Observation/Inspection:  Swelling  none    Deformity  none  Discoloration  none     Scars   none    Atrophy  none    HAND/WRIST EXAMINATION:  Finkelstein's Test   Neg  WHAT Test    Neg  Snuff box tenderness   Neg  Hook of Hamate Tenderness  Neg  CMC grind    Neg    Neurovascular Exam:  Digits WWP, brisk CR < 3s throughout  NVI motor/LTS to M/R/U nerves, radial pulse 2+  Tinel's Test - Carpal Tunnel  Neg  Tinel's Test - Cubital Tunnel  Neg  Phalen's Test    positive  Median Nerve Compression Test positive    ROM hand/wrist/elbow full, painless      EMG of bilateral UEs dated 6/14/22 and done at [facility] [place] is personally reviewed and shows:   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     EMG results scanned into chart.         Assessment:       Encounter Diagnoses   Name Primary?    Carpal tunnel syndrome on right Yes    Carpal tunnel syndrome on left           Plan:       Wilmar was seen today for pain.    Diagnoses and all orders for this visit:    Carpal tunnel syndrome on right    Carpal tunnel syndrome on left      She continues with intermittent bilateral hand pain/numbness/tingling that is worse at night and with lifting. She is right hand dominant. Right > left hand pain. Some relief with bracing and being off work.     She  has known mild degeneration in IP joints, especially DIP of right index finger. She has mucous cyst at DIP joint of right index finger. EMG shows bilateral right > left median neuropathies at the wrists.     Treatment options reviewed with patient along with above EMG. Following plan made:     - Discussed further treatment options including surgery and injections.   - They would like to try acupuncture. Will find outside provider. Script mailed to them.   - Continue with wrist braces prn. Will try to wear at work.   - If she gets worse, consider adding NSAIDs and/or revisiting above treatment options.     Follow up in about 2 months (around 10/1/2022).

## 2022-08-01 ENCOUNTER — OFFICE VISIT (OUTPATIENT)
Dept: ORTHOPEDICS | Facility: CLINIC | Age: 47
End: 2022-08-01
Payer: MEDICAID

## 2022-08-01 VITALS — BODY MASS INDEX: 26.26 KG/M2 | WEIGHT: 157.81 LBS

## 2022-08-01 DIAGNOSIS — G56.01 CARPAL TUNNEL SYNDROME ON RIGHT: Primary | ICD-10-CM

## 2022-08-01 DIAGNOSIS — G56.02 CARPAL TUNNEL SYNDROME ON LEFT: ICD-10-CM

## 2022-08-01 PROCEDURE — 3008F BODY MASS INDEX DOCD: CPT | Mod: CPTII,,, | Performed by: PHYSICIAN ASSISTANT

## 2022-08-01 PROCEDURE — 1160F PR REVIEW ALL MEDS BY PRESCRIBER/CLIN PHARMACIST DOCUMENTED: ICD-10-PCS | Mod: CPTII,,, | Performed by: PHYSICIAN ASSISTANT

## 2022-08-01 PROCEDURE — 99214 PR OFFICE/OUTPT VISIT, EST, LEVL IV, 30-39 MIN: ICD-10-PCS | Mod: S$PBB,,, | Performed by: PHYSICIAN ASSISTANT

## 2022-08-01 PROCEDURE — 1159F PR MEDICATION LIST DOCUMENTED IN MEDICAL RECORD: ICD-10-PCS | Mod: CPTII,,, | Performed by: PHYSICIAN ASSISTANT

## 2022-08-01 PROCEDURE — 3008F PR BODY MASS INDEX (BMI) DOCUMENTED: ICD-10-PCS | Mod: CPTII,,, | Performed by: PHYSICIAN ASSISTANT

## 2022-08-01 PROCEDURE — 99999 PR PBB SHADOW E&M-EST. PATIENT-LVL III: ICD-10-PCS | Mod: PBBFAC,,, | Performed by: PHYSICIAN ASSISTANT

## 2022-08-01 PROCEDURE — 1159F MED LIST DOCD IN RCRD: CPT | Mod: CPTII,,, | Performed by: PHYSICIAN ASSISTANT

## 2022-08-01 PROCEDURE — 1160F RVW MEDS BY RX/DR IN RCRD: CPT | Mod: CPTII,,, | Performed by: PHYSICIAN ASSISTANT

## 2022-08-01 PROCEDURE — 99214 OFFICE O/P EST MOD 30 MIN: CPT | Mod: S$PBB,,, | Performed by: PHYSICIAN ASSISTANT

## 2022-08-01 PROCEDURE — 99213 OFFICE O/P EST LOW 20 MIN: CPT | Mod: PBBFAC,PN | Performed by: PHYSICIAN ASSISTANT

## 2022-08-01 PROCEDURE — 99999 PR PBB SHADOW E&M-EST. PATIENT-LVL III: CPT | Mod: PBBFAC,,, | Performed by: PHYSICIAN ASSISTANT

## 2022-08-01 NOTE — PATIENT INSTRUCTIONS
It was nice to see you again today!     You have carpal tunnel in both hands, the right is worse.     Continue with wrist braces as needed. I would try to wear at work.     If symptoms get worse, we can consider an injection or surgery.     I will see you back in 2 months, but please stay in touch and call me if you need anything. You can also send me a message in [facility].     Meera   [phone]

## 2022-10-24 ENCOUNTER — HOSPITAL ENCOUNTER (OUTPATIENT)
Dept: RADIOLOGY | Facility: HOSPITAL | Age: 47
Discharge: HOME | End: 2022-10-24
Attending: INTERNAL MEDICINE
Payer: MEDICAID

## 2022-10-24 DIAGNOSIS — M54.2 CERVICALGIA: ICD-10-CM

## 2022-10-24 DIAGNOSIS — M54.2 CERVICALGIA: Primary | ICD-10-CM

## 2022-10-24 PROCEDURE — 72052 X-RAY EXAM NECK SPINE 6/>VWS: CPT | Mod: TC,FY

## 2022-10-24 PROCEDURE — 72052 X-RAY EXAM NECK SPINE 6/>VWS: CPT | Mod: 26,,, | Performed by: RADIOLOGY

## 2022-10-24 PROCEDURE — 72052 XR CERVICAL SPINE 5 VIEW WITH FLEX AND EXT: ICD-10-PCS | Mod: 26,,, | Performed by: RADIOLOGY

## 2022-12-13 NOTE — PROGRESS NOTES
Subjective:      Patient ID: Wilmar Holcomb is a 47 y.o. female.    Chief Complaint: Pain of the Right Hand (Patient presents today for a follow up for her right hand pain.)      SHORTY  (Wilmer)    She was last seen by me on 8/1/22 for bilateral hand numbness/tingling/pain. 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 She has mucous cyst at DIP joint of right index finger. 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     She wanted to try acupuncture after her last visit. She was to continue with wrist braces prn.     She is here for follow up.     She has intermittent numbness/tingling/pain in right > left hand. Some improvement with wrist braces. She rates her pain as a 6 on a scale of 1-10. No OT, surgery, or injections for her hands.     She quit her job at [email or web address] last week. Did not get acupuncture- was told she needed to get MRI first?      History reviewed. No pertinent past medical history.      Current Outpatient Medications:     fluticasone propionate (FLONASE) 50 mcg/actuation nasal spray, by Each Nostril route., Disp: , Rfl:     fluticasone propionate (FLONASE) 50 mcg/actuation nasal spray, 1 spray (50 mcg total) by Each Nostril route once daily., Disp: 18.2 mL, Rfl: 0    LINZESS 72 mcg Cap capsule, Take 72 mcg by mouth every morning., Disp: , Rfl:     montelukast (SINGULAIR) 10 mg tablet, Take 10 mg by mouth once daily., Disp: , Rfl:     polyethylene glycol (GLYCOLAX) 17 gram/dose powder, Take 17 g by mouth once daily., Disp: 510 g, Rfl: 11    cetirizine (ZYRTEC) 10 MG tablet, Take 1 tablet (10 mg total) by mouth once daily., Disp: 30 tablet, Rfl: 0    Review of patient's allergies indicates:  No Known Allergies    Review of Systems   Constitutional: Negative for chills, fever, night sweats and weight gain.   Gastrointestinal:  Negative for bowel incontinence, nausea and vomiting.   Genitourinary:  Negative for bladder incontinence.  "  Neurological:  Negative for disturbances in coordination and loss of balance.         Objective:        Ht 5' 5" (1.651 m)   Wt 75.1 kg (165 lb 8 oz)   LMP  (LMP Unknown)   BMI 27.54 kg/m²     Ortho/SPM Exam     RIGHT Hand/Wrist Examination:    Observation/Inspection:  Swelling  none    Deformity  none  Discoloration  none     Scars   none    Atrophy  none    HAND/WRIST EXAMINATION:  Finkelstein's Test   Neg  WHAT Test    Neg  Snuff box tenderness   Neg  Hook of Hamate Tenderness  Neg  CMC grind    Neg    Neurovascular Exam:  Digits WWP, brisk CR < 3s throughout  NVI motor/LTS to M/R/U nerves, radial pulse 2+  Tinel's Test - Carpal Tunnel  Neg  Tinel's Test - Cubital Tunnel  Neg  Phalen's Test    positive  Median Nerve Compression Test positive    ROM hand/wrist/elbow full, painless    She has mild swelling at DIP of index finger with mucous cyst.       LEFT Hand/Wrist Examination:    Observation/Inspection:  Swelling  none    Deformity  none  Discoloration  none     Scars   none    Atrophy  none    HAND/WRIST EXAMINATION:  Finkelstein's Test   Neg  WHAT Test    Neg  Snuff box tenderness   Neg  Hook of Hamate Tenderness  Neg  CMC grind    Neg    Neurovascular Exam:  Digits WWP, brisk CR < 3s throughout  NVI motor/LTS to M/R/U nerves, radial pulse 2+  Tinel's Test - Carpal Tunnel  Neg  Tinel's Test - Cubital Tunnel  Neg  Phalen's Test    positive  Median Nerve Compression Test positive    ROM hand/wrist/elbow full, painless        Assessment:       Encounter Diagnoses   Name Primary?    Carpal tunnel syndrome on right Yes    Carpal tunnel syndrome on left             Plan:       Wilmar was seen today for pain.    Diagnoses and all orders for this visit:    Carpal tunnel syndrome on right  -     Ambulatory referral/consult to Physical/Occupational Therapy; Future    Carpal tunnel syndrome on left  -     Ambulatory referral/consult to Physical/Occupational Therapy; Future        She continues with intermittent " bilateral hand pain/numbness/tingling that is worse at night and with lifting. She is right hand dominant. Right > left hand pain. Some relief with bracing and being off work. She quit her job at [employer] last week.     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 She has mucous cyst at DIP joint of right index finger. EMG shows bilateral right > left median neuropathies at the wrists.     Treatment options reviewed with patient and following plan made:     - Discussed further treatment options including surgery and injections. She wants to hold off on these.   - May see some improvement since quitting her job.   - Continue with wrist braces prn.   - OT for bilateral hands. Orders to [facility].     Follow up in about 2 months (around 2/15/2023).

## 2022-12-15 ENCOUNTER — OFFICE VISIT (OUTPATIENT)
Dept: ORTHOPEDICS | Facility: CLINIC | Age: 47
End: 2022-12-15
Payer: MEDICAID

## 2022-12-15 VITALS — WEIGHT: 165.5 LBS | HEIGHT: 65 IN | BODY MASS INDEX: 27.57 KG/M2

## 2022-12-15 DIAGNOSIS — G56.02 CARPAL TUNNEL SYNDROME ON LEFT: ICD-10-CM

## 2022-12-15 DIAGNOSIS — G56.01 CARPAL TUNNEL SYNDROME ON RIGHT: Primary | ICD-10-CM

## 2022-12-15 PROCEDURE — 99213 OFFICE O/P EST LOW 20 MIN: CPT | Mod: PBBFAC,PN | Performed by: PHYSICIAN ASSISTANT

## 2022-12-15 PROCEDURE — 99999 PR PBB SHADOW E&M-EST. PATIENT-LVL III: ICD-10-PCS | Mod: PBBFAC,,, | Performed by: PHYSICIAN ASSISTANT

## 2022-12-15 PROCEDURE — 99999 PR PBB SHADOW E&M-EST. PATIENT-LVL III: CPT | Mod: PBBFAC,,, | Performed by: PHYSICIAN ASSISTANT

## 2022-12-15 PROCEDURE — 99214 PR OFFICE/OUTPT VISIT, EST, LEVL IV, 30-39 MIN: ICD-10-PCS | Mod: S$PBB,,, | Performed by: PHYSICIAN ASSISTANT

## 2022-12-15 PROCEDURE — 99214 OFFICE O/P EST MOD 30 MIN: CPT | Mod: S$PBB,,, | Performed by: PHYSICIAN ASSISTANT

## 2022-12-15 PROCEDURE — 1160F RVW MEDS BY RX/DR IN RCRD: CPT | Mod: CPTII,,, | Performed by: PHYSICIAN ASSISTANT

## 2022-12-15 PROCEDURE — 1159F MED LIST DOCD IN RCRD: CPT | Mod: CPTII,,, | Performed by: PHYSICIAN ASSISTANT

## 2022-12-15 PROCEDURE — 1160F PR REVIEW ALL MEDS BY PRESCRIBER/CLIN PHARMACIST DOCUMENTED: ICD-10-PCS | Mod: CPTII,,, | Performed by: PHYSICIAN ASSISTANT

## 2022-12-15 PROCEDURE — 1159F PR MEDICATION LIST DOCUMENTED IN MEDICAL RECORD: ICD-10-PCS | Mod: CPTII,,, | Performed by: PHYSICIAN ASSISTANT

## 2022-12-15 PROCEDURE — 3008F BODY MASS INDEX DOCD: CPT | Mod: CPTII,,, | Performed by: PHYSICIAN ASSISTANT

## 2022-12-15 PROCEDURE — 3044F HG A1C LEVEL LT 7.0%: CPT | Mod: CPTII,,, | Performed by: PHYSICIAN ASSISTANT

## 2022-12-15 PROCEDURE — 3008F PR BODY MASS INDEX (BMI) DOCUMENTED: ICD-10-PCS | Mod: CPTII,,, | Performed by: PHYSICIAN ASSISTANT

## 2022-12-15 PROCEDURE — 3044F PR MOST RECENT HEMOGLOBIN A1C LEVEL <7.0%: ICD-10-PCS | Mod: CPTII,,, | Performed by: PHYSICIAN ASSISTANT

## 2022-12-15 NOTE — PATIENT INSTRUCTIONS
It was nice to see you again today!     You have carpal tunnel in both hands, the right is worse. You may see improvement in symptoms since stopping work.     Continue with wrist braces as needed.     I put in orders for occupational therapy. They should call you to schedule. If not, you can call [phone].     If symptoms get worse, we can consider an injection or surgery.     I will see you back in 2 months, but please stay in touch and call me if you need anything. You can also send me a message in [facility].     Meera   [phone]

## 2023-02-24 DIAGNOSIS — Z12.31 SCREENING MAMMOGRAM FOR BREAST CANCER: Primary | ICD-10-CM

## 2023-02-27 ENCOUNTER — HOSPITAL ENCOUNTER (OUTPATIENT)
Dept: RADIOLOGY | Facility: HOSPITAL | Age: 48
Discharge: HOME | End: 2023-02-27
Attending: INTERNAL MEDICINE
Payer: MEDICAID

## 2023-02-27 DIAGNOSIS — M25.561 RIGHT KNEE PAIN: Primary | ICD-10-CM

## 2023-02-27 DIAGNOSIS — M25.561 RIGHT KNEE PAIN: ICD-10-CM

## 2023-02-27 PROCEDURE — 73560 X-RAY EXAM OF KNEE 1 OR 2: CPT | Mod: TC,FY,RT

## 2023-02-27 PROCEDURE — 73560 X-RAY EXAM OF KNEE 1 OR 2: CPT | Mod: 26,RT,, | Performed by: RADIOLOGY

## 2023-02-27 PROCEDURE — 73560 XR KNEE 1 OR 2 VIEW RIGHT: ICD-10-PCS | Mod: 26,RT,, | Performed by: RADIOLOGY

## 2023-03-08 ENCOUNTER — CLINICAL SUPPORT (OUTPATIENT)
Dept: REHABILITATION | Facility: HOSPITAL | Age: 48
End: 2023-03-08
Attending: PHYSICIAN ASSISTANT
Payer: MEDICAID

## 2023-03-08 DIAGNOSIS — G56.01 CARPAL TUNNEL SYNDROME ON RIGHT: ICD-10-CM

## 2023-03-08 DIAGNOSIS — R29.898 REDUCED HAND STRENGTH: ICD-10-CM

## 2023-03-08 DIAGNOSIS — G56.02 CARPAL TUNNEL SYNDROME ON LEFT: ICD-10-CM

## 2023-03-08 DIAGNOSIS — R20.1 IMPAIRED SENSATION: ICD-10-CM

## 2023-03-08 PROCEDURE — 97165 OT EVAL LOW COMPLEX 30 MIN: CPT | Mod: PN

## 2023-03-08 PROCEDURE — 97530 THERAPEUTIC ACTIVITIES: CPT | Mod: PN

## 2023-03-08 NOTE — PLAN OF CARE
[facility]  Occupational Therapy Initial Evaluation    Date: 3/8/2023  Name: Bandar Holcomb  Clinic Number: [id number]    Therapy Diagnosis:   Encounter Diagnoses   Name Primary?    Carpal tunnel syndrome on right     Carpal tunnel syndrome on left     Reduced hand strength     Impaired sensation      Physician: Meera Mcbride, NIK    Physician Orders: OT Eval and Treat Order Notes: OT for bilateral hands. She has bilateral carpal tunnel syndrome. Eval and treat with all modalities. Good HEP.  Medical Diagnosis: G56.01 (ICD-10-CM) - Carpal tunnel syndrome on right; G56.02 (ICD-10-CM) - Carpal tunnel syndrome on left  Surgical Procedure and Date: N/A / Date of Injury/Onset: ~1 year ago   Evaluation Date: 3/8/2023  Insurance Authorization Period Expiration: 12/15/2023  Plan of Care Expiration: 6/2/23 (12 weeks)  Date of Return to MD: 5/22/2023   Visit # / Visits authorized: 1 / 1  FOTO: TBA next session    Precautions:  Standard    Time In: 7:45 am (Pt arrived 15 minutes late to appointment)  Time Out: 8:13 am  Total Appointment Time (timed & untimed codes): 28 minutes    SUBJECTIVE     Date of Onset: ~1 year ago     History of Current Condition/Mechanism of Injury: BANDAR reports: pain when using hands. Numbness and tingling only at nighttime. She states she wears braces to both hands full time and this helps. Also c/o decreased hand strength.     Falls: none     Involved Side: Bilateral  Dominant Side: Right  Imaging: Reviewed   Prior Therapy: none   Occupation: works at [employer]   Working presently: employed but not currently back to work due to condition   Duties: making sandwiches, using equipment     Functional Limitations/Social History:    Previous functional status includes: Independent with all ADLs and IADLs.     Current Functional Status   Home/Living environment: lives with their family,  and children      Limitation of Functional Status as follows:   ADLs/IADLs: Pt requires  "assistance for household chores.     - Feeding: Independent     - Bathing: Independent     - Dressing/Grooming: Independent     - Driving: Independent      Leisure: none noted     Pain:  Functional Pain Scale Rating 0-10: Current 5-6/10, worst 6/10, best 4/10   Location: B/L forearms   Description: "Heavy"  Aggravating Factors: Lifting  Easing Factors: rest and brace wear      Patient's Goals for Therapy: to feel better     Medical History:   No past medical history on file.    Surgical History:    has a past surgical history that includes Colonoscopy (N/A, 3/4/2021).    Medications:   has a current medication list which includes the following prescription(s): cetirizine, fluticasone propionate, fluticasone propionate, linzess, montelukast, and polyethylene glycol.    Allergies:   Review of patient's allergies indicates:  No Known Allergies       OBJECTIVE     Observation/Appearance: Normal appearance, skin warm and dry.     Edema. No significant edema noted.     Elbow and Wrist ROM. WFL - all planes of motion.     Hand ROM. WFL - Pt able to make full composite fists with B/L hands and demonstrates thumb opposition touch to SF DPC.      Strength (Dynamometer) and Pinch Strength (Pinch Gauge)  Measured in pounds to POP.   3/8/2023 3/8/2023    Left Right   Rung II 29 45   Key Pinch 5 9.5   3pt Pinch 4 6.5   2pt Pinch 2 6.5     Sensation. Not formally tested. Grossly intact per report. Pt endorses nocturnal numbness and tingling throughout B/L hands.     Special Tests  Phalen's Test + R   Tinel's Test + R     Limitation/Restriction for FOTO Survey    TBA next session.    Limitation Score: N/A       Treatment   Total Treatment time (time-based codes) separate from Evaluation: 10 minutes    BANDAR received the treatments listed below:     Supervised modalities after being cleared for contradictions: Hot Pack - Patient received moist heat x 5 min to B/L hands to increase blood flow, circulation, and tissue elasticity " prior to therex.    Therapeutic activities to improve functional performance for 10 minutes, including:  - Discussed Dx and conservative management   - Provided ASHT patient education handout on CTS   - Median/Ulnar nerve glides x 10 reps     Patient Education and Home Exercises      Education provided:   - Role of OT and POC  - HEP     Written Home Exercises Provided: yes.  Exercises were reviewed and BANDAR was able to demonstrate them prior to the end of the session.  BANDAR demonstrated fair-good understanding of the education provided. See EMR under Patient Instructions for exercises provided during therapy sessions.     Pt was advised to perform these exercises free of pain, and to stop performing them if pain occurs.    Patient/Family Education: role of OT, goals for OT, scheduling/cancellations - pt verbalized understanding. Discussed insurance limitations with patient.    ASSESSMENT     Bandar Holcomb is a 48 y.o. female referred to outpatient occupational therapy and presents with a medical diagnosis of carpal tunnel syndrome on right and carpal tunnel syndrome on left.  Patient presents with the following therapy deficits: Decreased  strength, Decreased pinch strength, Decreased muscle strength, Decreased functional hand use, Increased pain, Diminished/Impaired Sensation, and Diminished/Impaired Coordination and demonstrates limitations as described in the chart below. Following medical record review it is determined that pt will benefit from occupational therapy services in order to maximize pain free and/or functional use of bilateral hands. The following goals were discussed with the patient and patient is in agreement with them as to be addressed in the treatment plan. The patient's rehab potential is Good.     Anticipated barriers to occupational therapy: communication  Pt has no cultural, educational or language barriers to learning provided.    Profile and History Assessment of Occupational  Performance Level of Clinical Decision Making Complexity Score   Occupational Profile:   Wilmar Holcomb is a 48 y.o. female who lives with their family and is employed but not back to work. Wilmar Holcomb has difficulty with  ADLs and IADLs as listed previously, which  Affecting herdaily functional abilities.      Comorbidities:    has no past medical history on file.    Medical and Therapy History Review:   Brief               Performance Deficits    Physical:  Muscle Power/Strength  Muscle Endurance   Strength  Pinch Strength  Fine Motor Coordination  Tactile Functions  Pain    Cognitive:  Communication    Psychosocial:    Habits  Routines  Rituals     Clinical Decision Making:  low    Assessment Process:  Problem-Focused Assessments    Modification/Need for Assistance:  Minimal-Moderate Modifications/Assistance    Intervention Selection:  Limited Treatment Options       low  Based on PMHX, co morbidities , data from assessments and functional level of assistance required with task and clinical presentation directly impacting function.       Goals:   The following goals were discussed with the patient and patient is in agreement with them as to be addressed in the treatment plan.   Long Term Goals (LTGs); to be met by discharge.  LTG #1: Pt will report a pain level of 1-3 out of 10 at worst with average functional use of her hands.   LTG #2: Pt will return to prior level of function for IADLs and household management.     Short Term Goals (STGs); to be met within 4 weeks (4/7/23).  STG #1: Pt will report a pain level of 4 out of 10 at worst with average functional use of her hands.   STG #2: Pt will demonstrate independence with issued HEP, brace/orthosis wear, and modalities for pain/symptom management.  STG #3: FOTO to be administered and assessed next session with updates to goals as needed.  STG #4: Pt will increase bilateral  strength by at least 3-5 lbs each needed to open jars and carry groceries.   STG  #5: Pt will increase pinch strength (lateral/tripod/tip) by at least 1-3 psi per pinch, each hand needed to open packages.     PLAN   Plan of Care Certification: 3/8/2023 to 6/2/23 (12 weeks).     Outpatient Occupational Therapy 1 times weekly for 12 weeks to include the following interventions: Paraffin, Fluidotherapy, Manual therapy/joint mobilizations, Modalities for pain management, US 3 mhz, Therapeutic exercises/activities., Strengthening, Orthotic Fabrication/Fit/Training, Edema Control, Electrical Modalities, Joint Protection, and Energy Conservation.      Brynn Vance, OTR/L      I CERTIFY THE NEED FOR THESE SERVICES FURNISHED UNDER THIS PLAN OF TREATMENT AND WHILE UNDER MY CARE  Physician's comments:      Physician's Signature: ___________________________________________________

## 2023-03-08 NOTE — PATIENT INSTRUCTIONS
[facility], OCCUPATIONAL THERAPY  HOME EXERCISE PROGRAM     Median/Ulnar Nerve glides       Begin exercise with arms at sides and bend elbows to a 90* with palms facing up,   shoulders in a relaxed position       Straighten elbows, bend wrist so palms are facing the floor, then shrug shoulders (simultaneously).   Keep a fluid motion, alternating between the 2 positions.   Repeat 10 reps  Perform 4-6 times a day

## 2023-03-08 NOTE — PROGRESS NOTES
[facility]: Occupational Therapy Note     See plan of care for full initial OT evaluation.    Brynn Vance, CHRISTOPHER, OTR/L

## 2023-03-15 ENCOUNTER — HOSPITAL ENCOUNTER (OUTPATIENT)
Dept: RADIOLOGY | Facility: HOSPITAL | Age: 48
Discharge: HOME | End: 2023-03-15
Attending: INTERNAL MEDICINE
Payer: MEDICAID

## 2023-03-15 ENCOUNTER — CLINICAL SUPPORT (OUTPATIENT)
Dept: REHABILITATION | Facility: HOSPITAL | Age: 48
End: 2023-03-15
Attending: PHYSICIAN ASSISTANT
Payer: MEDICAID

## 2023-03-15 DIAGNOSIS — Z12.31 SCREENING MAMMOGRAM FOR BREAST CANCER: ICD-10-CM

## 2023-03-15 DIAGNOSIS — R20.1 IMPAIRED SENSATION: ICD-10-CM

## 2023-03-15 DIAGNOSIS — R29.898 REDUCED HAND STRENGTH: Primary | ICD-10-CM

## 2023-03-15 PROCEDURE — 77067 SCR MAMMO BI INCL CAD: CPT | Mod: TC

## 2023-03-15 PROCEDURE — 97530 THERAPEUTIC ACTIVITIES: CPT | Mod: PN

## 2023-03-15 PROCEDURE — 77067 SCR MAMMO BI INCL CAD: CPT | Mod: 26,,, | Performed by: RADIOLOGY

## 2023-03-15 PROCEDURE — 77063 BREAST TOMOSYNTHESIS BI: CPT | Mod: 26,,, | Performed by: RADIOLOGY

## 2023-03-15 PROCEDURE — 77063 MAMMO DIGITAL SCREENING BILAT WITH TOMO: ICD-10-PCS | Mod: 26,,, | Performed by: RADIOLOGY

## 2023-03-15 PROCEDURE — 77067 MAMMO DIGITAL SCREENING BILAT WITH TOMO: ICD-10-PCS | Mod: 26,,, | Performed by: RADIOLOGY

## 2023-03-15 NOTE — PROGRESS NOTES
"[facility]  Occupational Therapy Treatment Note    Date: 3/15/2023  Name: Wilmar Holcomb  Clinic Number: [id number]    Therapy Diagnosis:   Encounter Diagnoses   Name Primary?    Reduced hand strength Yes    Impaired sensation      Physician: Meera Mcbride PA-C    Physician Orders: OT Eval and Treat Order Notes: OT for bilateral hands. She has bilateral carpal tunnel syndrome. Eval and treat with all modalities. Good HEP.  Medical Diagnosis: G56.01 (ICD-10-CM) - Carpal tunnel syndrome on right; G56.02 (ICD-10-CM) - Carpal tunnel syndrome on left  Surgical Procedure and Date: N/A / Date of Injury/Onset: ~1 year ago   Evaluation Date: 3/8/2023  Insurance Authorization Period Expiration: 06/13/2023  Plan of Care Expiration: 6/2/23 (12 weeks)  Date of Return to MD: 5/22/2023  Visit # / Visits authorized: 2 / 9  FOTO: TBA next session    Precautions:  Standard    Time In: 10:35 am  Time Out: 11:01 am  Total Billable Time: 26 minutes     SUBJECTIVE     Pt reports: "Starting today, they're feeling better."   She was compliant with home exercise program given last session.   Response to previous treatment: Good - decreased pain  Functional change: wearing B/L wrist braces full time     Pain: 0/10  Location: bilateral hands/wrists    OBJECTIVE   Objective Measures updated at progress report unless specified.    Observation/Appearance: Normal appearance, skin warm and dry.      Edema. No significant edema noted.      Elbow and Wrist ROM. WFL - all planes of motion.      Hand ROM. WFL - Pt able to make full composite fists with B/L hands and demonstrates thumb opposition touch to SF DPC.       Strength (Dynamometer) and Pinch Strength (Pinch Gauge)  Measured in pounds to POP.    3/8/2023 3/8/2023     Left Right   Rung II 29 45   Key Pinch 5 9.5   3pt Pinch 4 6.5   2pt Pinch 2 6.5      Sensation. Not formally tested. Grossly intact per report. Pt endorses nocturnal numbness and tingling throughout " B/L hands.      Special Tests  Phalen's Test + R   Tinel's Test + R       Treatment     BANDAR received the treatments listed below:     Supervised modalities after being cleared for contradictions: Paraffin bath - with moist heat pack to B/L hand(s) for 10 minutes to increase blood flow, circulation, pain management, and for tissue elasticity prior to therex.     Therapeutic activities to improve functional performance for 16 minutes, including:  -Performed cross friction massage to B/L wrists to decrease edema, improve joint mobility, decrease pain, and promote proper healing via increased circulation.   -TGEs x 10  -Prayer stretch 3 x 30 sec holds  -Median/ulnar nerve glides x 10 reps  -Wrist dextericiser x 2 min each hand    Patient Education and Home Exercises      Education provided:   - Continue prior HEP provided  - Progress towards goals     Written Home Exercises Provided: Patient instructed to cont prior HEP.  Exercises were reviewed and BANDAR was able to demonstrate them prior to the end of the session.  BANDAR demonstrated fair -good understanding of the HEP provided. See EMR under Patient Instructions for exercises provided during therapy sessions.      ASSESSMENT     Pt returns for her first follow-up visit this date. She endorses good adherence to HEP and brace wear resulting in no c/o pain since last visit. Progressed with light activities today and tolerated it well.      BANDAR is progressing well towards her goals and there are no updates to goals at this time. Pt prognosis is Good.     Pt will continue to benefit from skilled outpatient occupational therapy to address the deficits listed in the problem list on initial evaluation, provide pt/family education and to maximize pt's level of independence in the home and community environment.     Pt's spiritual, cultural and educational needs considered and pt agreeable to plan of care and goals.    Anticipated barriers to occupational therapy:  communication    Goals:   The following goals were discussed with the patient and patient is in agreement with them as to be addressed in the treatment plan.   Long Term Goals (LTGs); to be met by discharge.  LTG #1: Pt will report a pain level of 1-3 out of 10 at worst with average functional use of her hands. progressing not met 3/15/2023  LTG #2: Pt will return to prior level of function for IADLs and household management. progressing not met 3/15/2023     Short Term Goals (STGs); to be met within 4 weeks (4/7/23).  STG #1: Pt will report a pain level of 4 out of 10 at worst with average functional use of her hands. progressing not met 3/15/2023  STG #2: Pt will demonstrate independence with issued HEP, brace/orthosis wear, and modalities for pain/symptom management. progressing not met 3/15/2023  STG #3: FOTO to be administered and assessed next session with updates to goals as needed. progressing not met 3/15/2023  STG #4: Pt will increase bilateral  strength by at least 3-5 lbs each needed to open jars and carry groceries. progressing not met 3/15/2023  STG #5: Pt will increase pinch strength (lateral/tripod/tip) by at least 1-3 psi per pinch, each hand needed to open packages. progressing not met 3/15/2023    PLAN     Continue skilled occupational therapy with individualized plan of care focusing on maximizing functional use of patient's bilateral hands.    Updates/Grading for next session: progress as tolerated.      Brynn Vance, OTR/L

## 2023-03-22 ENCOUNTER — CLINICAL SUPPORT (OUTPATIENT)
Dept: REHABILITATION | Facility: HOSPITAL | Age: 48
End: 2023-03-22
Attending: PHYSICIAN ASSISTANT
Payer: MEDICAID

## 2023-03-22 DIAGNOSIS — R20.1 IMPAIRED SENSATION: ICD-10-CM

## 2023-03-22 DIAGNOSIS — R29.898 REDUCED HAND STRENGTH: Primary | ICD-10-CM

## 2023-03-22 PROCEDURE — 97530 THERAPEUTIC ACTIVITIES: CPT | Mod: PN

## 2023-03-22 NOTE — PATIENT INSTRUCTIONS
[facility] - OCCUPATIONAL THERAPY  HOME EXERCISE PROGRAM     Complete the following exercises with 10 repetitions each, 4 x/day.       AROM: Abduction / Adduction  With hand flat on table, spread all fingers apart, then bring them together as close as possible.    Copyright © I. All rights reserved.       MEDIAN NERVE GLIDING    Complete 10 repetitions of each exercise 4 times a day.      Make a fist, keep the wrist straight.  Straighten the fingers, keep the wrist straight.  Gently bend the wrist back, keep the thumb close to the fingers.  Extend the thumb out.  Turn forearm so palm is facing up.  Gently stretch the thumb out using the other hand.    If any of these exercises aggravate your hands, stop, rest and try returning again to the previous exercise performed without pain. Proceed at your own pace using pain tolerance as your guide.

## 2023-03-22 NOTE — PROGRESS NOTES
"[facility]  Occupational Therapy Treatment Note    Date: 3/22/2023  Name: Wilmar Holcomb  Clinic Number: [id number]    Therapy Diagnosis:   Encounter Diagnoses   Name Primary?    Reduced hand strength Yes    Impaired sensation      Physician: Meera Mcbride PA-C    Physician Orders: OT Eval and Treat Order Notes: OT for bilateral hands. She has bilateral carpal tunnel syndrome. Eval and treat with all modalities. Good HEP.  Medical Diagnosis: G56.01 (ICD-10-CM) - Carpal tunnel syndrome on right; G56.02 (ICD-10-CM) - Carpal tunnel syndrome on left  Surgical Procedure and Date: N/A / Date of Injury/Onset: ~1 year ago   Evaluation Date: 3/8/2023  Insurance Authorization Period Expiration: 06/13/2023  Plan of Care Expiration: 6/2/23 (12 weeks)  Date of Return to MD: 5/22/2023  Visit # / Visits authorized: 3 / 9  FOTO: TBA next session    Precautions:  Standard    Time In: 8:25 am  Time Out: 8:54 am  Total Billable Time: 29 minutes     SUBJECTIVE     Pt reports: "Sore, no pain. Last night it was numb, but not like before."  She was compliant with home exercise program given last session.   Response to previous treatment: Good - decreased pain  Functional change: wearing B/L wrist braces full time     Pain: 0/10  Location: bilateral hands/wrists    OBJECTIVE   Objective Measures updated at progress report unless specified.    Observation/Appearance: Normal appearance, skin warm and dry.      Edema. No significant edema noted.      Elbow and Wrist ROM. WFL - all planes of motion.      Hand ROM. WFL - Pt able to make full composite fists with B/L hands and demonstrates thumb opposition touch to SF DPC.       Strength (Dynamometer) and Pinch Strength (Pinch Gauge)  Measured in pounds to POP.    3/8/2023 3/8/2023     Left Right   Rung II 29 45   Key Pinch 5 9.5   3pt Pinch 4 6.5   2pt Pinch 2 6.5      Sensation. Not formally tested. Grossly intact per report. Pt endorses nocturnal numbness and " tingling throughout B/L hands.      Special Tests  Phalen's Test + R   Tinel's Test + R       Treatment     BANDAR received the treatments listed below:     Supervised modalities after being cleared for contradictions: Paraffin bath - with moist heat pack to B/L hand(s) for 10 minutes to increase blood flow, circulation, pain management, and for tissue elasticity prior to therex.     Therapeutic activities bilaterally to improve functional performance for 19 minutes, including:  -Performed cross friction massage to B/L wrists to decrease edema, improve joint mobility, decrease pain, and promote proper healing via increased circulation.   -TGEs x 10  -Prayer stretch 3 x 30 sec holds  -Finger add/abd for intrinsics excursion x 10 reps  -Median/ulnar nerve glides x 10 reps   -Median nerve glide x 10 reps  -Wrist dextericiser x 2 min each hand     Patient Education and Home Exercises      Education provided:   - Continue prior HEP provided, added: lumbrical excursion and median nerve glides  - Progress towards goals     Written Home Exercises Provided: yes.  Exercises were reviewed and BANDAR was able to demonstrate them prior to the end of the session.  BANDAR demonstrated fair -good understanding of the HEP provided. See EMR under Patient Instructions for exercises provided during therapy sessions.      ASSESSMENT     Pt had good tolerance to treatment this date. She cont to endorse good adherence to HEP and brace wear resulting in no c/o pain, just soreness. She continues to have nocturnal numbness and tingling in bilateral hands. Will progress as tolerated.     BANDAR is progressing well towards her goals and there are no updates to goals at this time. Pt prognosis is Good.     Pt will continue to benefit from skilled outpatient occupational therapy to address the deficits listed in the problem list on initial evaluation, provide pt/family education and to maximize pt's level of independence in the home and community  environment.     Pt's spiritual, cultural and educational needs considered and pt agreeable to plan of care and goals.    Anticipated barriers to occupational therapy: communication    Goals:   The following goals were discussed with the patient and patient is in agreement with them as to be addressed in the treatment plan.   Long Term Goals (LTGs); to be met by discharge.  LTG #1: Pt will report a pain level of 1-3 out of 10 at worst with average functional use of her hands. progressing not met 3/22/2023  LTG #2: Pt will return to prior level of function for IADLs and household management. progressing not met 3/22/2023     Short Term Goals (STGs); to be met within 4 weeks (4/7/23).  STG #1: Pt will report a pain level of 4 out of 10 at worst with average functional use of her hands. progressing not met 3/22/2023  STG #2: Pt will demonstrate independence with issued HEP, brace/orthosis wear, and modalities for pain/symptom management. progressing not met 3/22/2023  STG #3: FOTO to be administered and assessed next session with updates to goals as needed. progressing not met 3/22/2023  STG #4: Pt will increase bilateral  strength by at least 3-5 lbs each needed to open jars and carry groceries. progressing not met 3/22/2023  STG #5: Pt will increase pinch strength (lateral/tripod/tip) by at least 1-3 psi per pinch, each hand needed to open packages. progressing not met 3/22/2023    PLAN     Continue skilled occupational therapy with individualized plan of care focusing on maximizing functional use of patient's bilateral hands.    Updates/Grading for next session: progress as tolerated.      Brynn Vance, OTR/L

## 2023-03-29 ENCOUNTER — CLINICAL SUPPORT (OUTPATIENT)
Dept: REHABILITATION | Facility: HOSPITAL | Age: 48
End: 2023-03-29
Attending: PHYSICIAN ASSISTANT
Payer: MEDICAID

## 2023-03-29 DIAGNOSIS — R20.1 IMPAIRED SENSATION: ICD-10-CM

## 2023-03-29 DIAGNOSIS — R29.898 REDUCED HAND STRENGTH: Primary | ICD-10-CM

## 2023-03-29 PROCEDURE — 97530 THERAPEUTIC ACTIVITIES: CPT | Mod: PN

## 2023-03-29 NOTE — PROGRESS NOTES
"[facility]  Occupational Therapy Treatment Note    Date: 3/29/2023  Name: Wilmar Holcomb  Clinic Number: [id number]    Therapy Diagnosis:   Encounter Diagnoses   Name Primary?    Reduced hand strength Yes    Impaired sensation        Physician: Meera Mcbride PA-C    Physician Orders: OT Eval and Treat Order Notes: OT for bilateral hands. She has bilateral carpal tunnel syndrome. Eval and treat with all modalities. Good HEP.  Medical Diagnosis: G56.01 (ICD-10-CM) - Carpal tunnel syndrome on right; G56.02 (ICD-10-CM) - Carpal tunnel syndrome on left  Surgical Procedure and Date: N/A / Date of Injury/Onset: ~1 year ago   Evaluation Date: 3/8/2023  Insurance Authorization Period Expiration: 06/13/2023  Plan of Care Expiration: 6/2/23 (12 weeks)  Date of Return to MD: 5/22/2023  Visit # / Visits authorized: 4 / 9  FOTO: TBA next session    Precautions:  Standard    Time In: 8:15 am  Time Out: 8:45 am  Total Billable Time: 30 minutes     SUBJECTIVE     Pt reports: "Sore like I exercised."  She was compliant with home exercise program given last session.   Response to previous treatment: Fair  Functional change: wearing B/L wrist braces full time     Pain: 4/10  Location: bilateral hands/wrists    OBJECTIVE   Objective Measures updated at progress report unless specified.    Observation/Appearance: Normal appearance, skin warm and dry.      Edema. No significant edema noted.      Elbow and Wrist ROM. WFL - all planes of motion.      Hand ROM. WFL - Pt able to make full composite fists with B/L hands and demonstrates thumb opposition touch to  DPC.       Strength (Dynamometer) and Pinch Strength (Pinch Gauge)  Measured in pounds to POP.    3/8/2023 3/8/2023     Left Right   Rung II 29 45   Key Pinch 5 9.5   3pt Pinch 4 6.5   2pt Pinch 2 6.5      Sensation. Not formally tested. Grossly intact per report. Pt endorses nocturnal numbness and tingling throughout B/L hands.      Special " Tests  Phalen's Test + R   Tinel's Test + R       Treatment     BANDAR received the treatments listed below:     Supervised modalities after being cleared for contradictions: Paraffin bath - with moist heat pack to B/L hand(s) for 15 minutes to increase blood flow, circulation, pain management, and for tissue elasticity prior to therex.     Therapeutic activities bilaterally to improve functional performance for 15 minutes, including:  -Performed cross friction massage to B/L wrists to decrease edema, improve joint mobility, decrease pain, and promote proper healing via increased circulation.   -TGEs x 10  -Prayer stretch 3 x 30 sec holds  -Finger add/abd for intrinsics excursion x 10 reps  -Median/ulnar nerve glides x 10 reps   -Median nerve glide x 10 reps   -Wrist dextericiser x 2 min each hand     Patient Education and Home Exercises      Education provided:   - Continue prior HEP provided  - Progress towards goals     Written Home Exercises Provided: Patient instructed to cont prior HEP.  Exercises were reviewed and BANDAR was able to demonstrate them prior to the end of the session.  BANDAR demonstrated fair -good understanding of the HEP provided. See EMR under Patient Instructions for exercises provided during therapy sessions.      ASSESSMENT     Pt had good tolerance to treatment this date. She cont to endorse good adherence to HEP and brace wear resulting in no c/o pain, just soreness. She continues to have nocturnal numbness and tingling in bilateral hands. Will progress as tolerated.     BANDAR is progressing well towards her goals and there are no updates to goals at this time. Pt prognosis is Good.     Pt will continue to benefit from skilled outpatient occupational therapy to address the deficits listed in the problem list on initial evaluation, provide pt/family education and to maximize pt's level of independence in the home and community environment.     Pt's spiritual, cultural and educational needs  considered and pt agreeable to plan of care and goals.    Anticipated barriers to occupational therapy: communication    Goals:   The following goals were discussed with the patient and patient is in agreement with them as to be addressed in the treatment plan.   Long Term Goals (LTGs); to be met by discharge.  LTG #1: Pt will report a pain level of 1-3 out of 10 at worst with average functional use of her hands. progressing not met 3/29/2023  LTG #2: Pt will return to prior level of function for IADLs and household management. progressing not met 3/29/2023     Short Term Goals (STGs); to be met within 4 weeks (4/7/23).  STG #1: Pt will report a pain level of 4 out of 10 at worst with average functional use of her hands. progressing not met 3/29/2023  STG #2: Pt will demonstrate independence with issued HEP, brace/orthosis wear, and modalities for pain/symptom management. progressing not met 3/29/2023  STG #3: FOTO to be administered and assessed next session with updates to goals as needed. progressing not met 3/29/2023  STG #4: Pt will increase bilateral  strength by at least 3-5 lbs each needed to open jars and carry groceries. progressing not met 3/29/2023  STG #5: Pt will increase pinch strength (lateral/tripod/tip) by at least 1-3 psi per pinch, each hand needed to open packages. progressing not met 3/29/2023    PLAN     Continue skilled occupational therapy with individualized plan of care focusing on maximizing functional use of patient's bilateral hands.    Updates/Grading for next session: progress as tolerated.      Brynn Vance, OTR/L

## 2023-04-05 ENCOUNTER — CLINICAL SUPPORT (OUTPATIENT)
Dept: REHABILITATION | Facility: HOSPITAL | Age: 48
End: 2023-04-05
Attending: PHYSICIAN ASSISTANT
Payer: MEDICAID

## 2023-04-05 DIAGNOSIS — R20.1 IMPAIRED SENSATION: ICD-10-CM

## 2023-04-05 DIAGNOSIS — R29.898 REDUCED HAND STRENGTH: Primary | ICD-10-CM

## 2023-04-05 PROCEDURE — 97530 THERAPEUTIC ACTIVITIES: CPT | Mod: PN

## 2023-04-05 NOTE — PROGRESS NOTES
[facility]  Occupational Therapy Treatment Note    Date: 4/5/2023  Name: Wilmar Holcomb  Clinic Number: [id number]    Therapy Diagnosis:   Encounter Diagnoses   Name Primary?    Reduced hand strength Yes    Impaired sensation          Physician: Meera Mcbride PA-C    Physician Orders: OT Eval and Treat Order Notes: OT for bilateral hands. She has bilateral carpal tunnel syndrome. Eval and treat with all modalities. Good HEP.  Medical Diagnosis: G56.01 (ICD-10-CM) - Carpal tunnel syndrome on right; G56.02 (ICD-10-CM) - Carpal tunnel syndrome on left  Surgical Procedure and Date: N/A / Date of Injury/Onset: ~1 year ago   Evaluation Date: 3/8/2023  Insurance Authorization Period Expiration: 06/13/2023  Plan of Care Expiration: 6/2/23 (12 weeks)  Date of Return to MD: 5/22/2023  Visit # / Visits authorized: 4 / 9  FOTO: TBA next session    Precautions:  Standard    Time In: 1000  Time Out: 1045  Total Billable Time: 45 minutes     SUBJECTIVE     Pt reports: She is more sore today than she has been previously.   She was compliant with home exercise program given last session.   Response to previous treatment: Fair  Functional change: wearing B/L wrist braces full time     Pain: 5/10  Location: bilateral hands/wrists    OBJECTIVE   Objective Measures updated at progress report unless specified.    Observation/Appearance: Normal appearance, skin warm and dry.      Edema. No significant edema noted.      Elbow and Wrist ROM. WFL - all planes of motion.      Hand ROM. WFL - Pt able to make full composite fists with B/L hands and demonstrates thumb opposition touch to  DPC.       Strength (Dynamometer) and Pinch Strength (Pinch Gauge)  Measured in pounds to POP.    3/8/2023 3/8/2023     Left Right   Rung II 29 45   Key Pinch 5 9.5   3pt Pinch 4 6.5   2pt Pinch 2 6.5      Sensation. Not formally tested. Grossly intact per report. Pt endorses nocturnal numbness and tingling throughout B/L hands.       Special Tests  Phalen's Test + R   Tinel's Test + R       Treatment     BANDAR received the treatments listed below:     Supervised modalities after being cleared for contradictions: Paraffin bath - with moist heat pack to B/L hand(s) for 15 minutes to increase blood flow, circulation, pain management, and for tissue elasticity prior to therex.     Therapeutic activities bilaterally to improve functional performance for 15 minutes, including:  -Performed cross friction massage to B/L wrists to decrease edema, improve joint mobility, decrease pain, and promote proper healing via increased circulation.   -TGEs x 10  -Prayer stretch 3 x 30 sec holds  -Finger add/abd for intrinsics excursion x 10 reps  -Median/ulnar nerve glides x 10 reps   -Median nerve glide x 10 reps   -Wrist dextericiser x 2 min each hand   -wrist wheel 2 min each hand    Patient Education and Home Exercises      Education provided:   - Continue prior HEP provided  - Progress towards goals     Written Home Exercises Provided: Patient instructed to cont prior HEP.  Exercises were reviewed and BANDAR was able to demonstrate them prior to the end of the session.  BANDAR demonstrated fair -good understanding of the HEP provided. See EMR under Patient Instructions for exercises provided during therapy sessions.      ASSESSMENT     Pt had good tolerance to treatment this date. She cont to endorse good adherence to HEP and brace wear resulting in no c/o pain, just soreness. She continues to have nocturnal numbness and tingling in bilateral hands. Will progress as tolerated.     BANDAR is progressing well towards her goals and there are no updates to goals at this time. Pt prognosis is Good.     Pt will continue to benefit from skilled outpatient occupational therapy to address the deficits listed in the problem list on initial evaluation, provide pt/family education and to maximize pt's level of independence in the home and community environment.     Pt's  spiritual, cultural and educational needs considered and pt agreeable to plan of care and goals.    Anticipated barriers to occupational therapy: communication    Goals:   The following goals were discussed with the patient and patient is in agreement with them as to be addressed in the treatment plan.   Long Term Goals (LTGs); to be met by discharge.  LTG #1: Pt will report a pain level of 1-3 out of 10 at worst with average functional use of her hands. progressing not met 4/5/2023  LTG #2: Pt will return to prior level of function for IADLs and household management. progressing not met 4/5/2023     Short Term Goals (STGs); to be met within 4 weeks (4/7/23).  STG #1: Pt will report a pain level of 4 out of 10 at worst with average functional use of her hands. progressing not met 4/5/2023  STG #2: Pt will demonstrate independence with issued HEP, brace/orthosis wear, and modalities for pain/symptom management. progressing not met 4/5/2023  STG #3: FOTO to be administered and assessed next session with updates to goals as needed. progressing not met 4/5/2023  STG #4: Pt will increase bilateral  strength by at least 3-5 lbs each needed to open jars and carry groceries. progressing not met 4/5/2023  STG #5: Pt will increase pinch strength (lateral/tripod/tip) by at least 1-3 psi per pinch, each hand needed to open packages. progressing not met 4/5/2023    PLAN     Continue skilled occupational therapy with individualized plan of care focusing on maximizing functional use of patient's bilateral hands.    Updates/Grading for next session: progress as tolerated.      Elisha Matt, OTR/L

## 2023-04-12 ENCOUNTER — CLINICAL SUPPORT (OUTPATIENT)
Dept: REHABILITATION | Facility: HOSPITAL | Age: 48
End: 2023-04-12
Attending: PHYSICIAN ASSISTANT
Payer: MEDICAID

## 2023-04-12 DIAGNOSIS — R29.898 REDUCED HAND STRENGTH: Primary | ICD-10-CM

## 2023-04-12 DIAGNOSIS — R20.1 IMPAIRED SENSATION: ICD-10-CM

## 2023-04-12 PROCEDURE — 97530 THERAPEUTIC ACTIVITIES: CPT | Mod: PN

## 2023-04-12 PROCEDURE — 97140 MANUAL THERAPY 1/> REGIONS: CPT | Mod: PN

## 2023-04-12 PROCEDURE — 97018 PARAFFIN BATH THERAPY: CPT | Mod: PN

## 2023-04-12 NOTE — PROGRESS NOTES
[facility]  Occupational Therapy Treatment Note    Date: 4/12/2023  Name: Wilmar Holcomb  Clinic Number: [id number]    Therapy Diagnosis:   Encounter Diagnoses   Name Primary?    Reduced hand strength Yes    Impaired sensation          Physician: Meera Mcbride PA-C    Physician Orders: OT Eval and Treat Order Notes: OT for bilateral hands. She has bilateral carpal tunnel syndrome. Eval and treat with all modalities. Good HEP.  Medical Diagnosis: G56.01 (ICD-10-CM) - Carpal tunnel syndrome on right; G56.02 (ICD-10-CM) - Carpal tunnel syndrome on left  Surgical Procedure and Date: N/A / Date of Injury/Onset: ~1 year ago   Evaluation Date: 3/8/2023  Insurance Authorization Period Expiration: 06/13/2023  Plan of Care Expiration: 6/2/23 (12 weeks)  Date of Return to MD: 5/22/2023  Visit # / Visits authorized: 5 / 9  FOTO: TBA next session    Precautions:  Standard    Time In: 0830  Time Out: 0915  Total Billable Time: 45 minutes     SUBJECTIVE     Pt reports: She reports here pain has significantly decreased.  She was compliant with home exercise program given last session.   Response to previous treatment: Good  Functional change: increased range of motion    Pain: 1/10  Location: bilateral hands/wrists    OBJECTIVE   Objective Measures updated at progress report unless specified.    Observation/Appearance: Normal appearance, skin warm and dry.      Edema. No significant edema noted.      Elbow and Wrist ROM. WFL - all planes of motion.      Hand ROM. WFL - Pt able to make full composite fists with B/L hands and demonstrates thumb opposition touch to  DPC.       Strength (Dynamometer) and Pinch Strength (Pinch Gauge)  Measured in pounds to POP.    3/8/2023 3/8/2023     Left Right   Rung II 29 45   Key Pinch 5 9.5   3pt Pinch 4 6.5   2pt Pinch 2 6.5      Sensation. Not formally tested. Grossly intact per report. Pt endorses nocturnal numbness and tingling throughout B/L hands.      Special  Tests  Phalen's Test + R   Tinel's Test + R       Treatment     BANDAR received the treatments listed below:     Supervised modalities after being cleared for contradictions: Paraffin bath - with moist heat pack to B/L hand(s) for 15 minutes to increase blood flow, circulation, pain management, and for tissue elasticity prior to therex.     Therapeutic activities bilaterally to improve functional performance for 15 minutes, including:  -Performed cross friction massage to B/L wrists to decrease edema, improve joint mobility, decrease pain, and promote proper healing via increased circulation.   -TGEs x 10  -Prayer stretch 3 x 30 sec holds  -Finger add/abd for intrinsics excursion x 10 reps  -Median/ulnar nerve glides x 10 reps   -Median nerve glide x 10 reps   -Wrist dextericiser x 2 min each hand   -wrist wheel 2 min each hand  -pro/sup cones with bilateral hands     Manual Therapy : 15 minutes  -IASTM to bilateral wrists and hands   -joint mobilizations of bilateral wrists  -PROM of bilateral wrists   Patient Education and Home Exercises      Education provided:   - Continue prior HEP provided  - Progress towards goals     Written Home Exercises Provided: Patient instructed to cont prior HEP.  Exercises were reviewed and BANDAR was able to demonstrate them prior to the end of the session.  BANDAR demonstrated fair -good understanding of the HEP provided. See EMR under Patient Instructions for exercises provided during therapy sessions.      ASSESSMENT     Pt had good tolerance to treatment this date. She cont to endorse good adherence to HEP and brace wear resulting in no c/o pain, just soreness. She continues to have nocturnal numbness and tingling in bilateral hands. Will progress as tolerated.     BANDAR is progressing well towards her goals and there are no updates to goals at this time. Pt prognosis is Good.     Pt will continue to benefit from skilled outpatient occupational therapy to address the deficits  listed in the problem list on initial evaluation, provide pt/family education and to maximize pt's level of independence in the home and community environment.     Pt's spiritual, cultural and educational needs considered and pt agreeable to plan of care and goals.    Anticipated barriers to occupational therapy: communication    Goals:   The following goals were discussed with the patient and patient is in agreement with them as to be addressed in the treatment plan.   Long Term Goals (LTGs); to be met by discharge.  LTG #1: Pt will report a pain level of 1-3 out of 10 at worst with average functional use of her hands. progressing not met 4/12/2023  LTG #2: Pt will return to prior level of function for IADLs and household management. progressing not met 4/12/2023     Short Term Goals (STGs); to be met within 4 weeks (4/7/23).  STG #1: Pt will report a pain level of 4 out of 10 at worst with average functional use of her hands. progressing not met 4/12/2023  STG #2: Pt will demonstrate independence with issued HEP, brace/orthosis wear, and modalities for pain/symptom management. progressing not met 4/12/2023  STG #3: FOTO to be administered and assessed next session with updates to goals as needed. progressing not met 4/12/2023  STG #4: Pt will increase bilateral  strength by at least 3-5 lbs each needed to open jars and carry groceries. progressing not met 4/12/2023  STG #5: Pt will increase pinch strength (lateral/tripod/tip) by at least 1-3 psi per pinch, each hand needed to open packages. progressing not met 4/12/2023    PLAN     Continue skilled occupational therapy with individualized plan of care focusing on maximizing functional use of patient's bilateral hands.    Updates/Grading for next session: progress as tolerated.      Elisha Matt, OTR/L

## 2023-04-19 ENCOUNTER — CLINICAL SUPPORT (OUTPATIENT)
Dept: REHABILITATION | Facility: HOSPITAL | Age: 48
End: 2023-04-19
Attending: PHYSICIAN ASSISTANT
Payer: MEDICAID

## 2023-04-19 DIAGNOSIS — R20.1 IMPAIRED SENSATION: ICD-10-CM

## 2023-04-19 DIAGNOSIS — R29.898 REDUCED HAND STRENGTH: Primary | ICD-10-CM

## 2023-04-19 PROCEDURE — 97530 THERAPEUTIC ACTIVITIES: CPT | Mod: PN

## 2023-04-19 NOTE — PROGRESS NOTES
[facility]  Occupational Therapy Treatment Note    Date: 4/19/2023  Name: Wilmar Holcomb  Clinic Number: [id number]    Therapy Diagnosis:   Encounter Diagnoses   Name Primary?    Reduced hand strength Yes    Impaired sensation          Physician: Meera Mcbride PA-C    Physician Orders: OT Eval and Treat Order Notes: OT for bilateral hands. She has bilateral carpal tunnel syndrome. Eval and treat with all modalities. Good HEP.  Medical Diagnosis: G56.01 (ICD-10-CM) - Carpal tunnel syndrome on right; G56.02 (ICD-10-CM) - Carpal tunnel syndrome on left  Surgical Procedure and Date: N/A / Date of Injury/Onset: ~1 year ago   Evaluation Date: 3/8/2023  Insurance Authorization Period Expiration: 06/13/2023  Plan of Care Expiration: 6/2/23 (12 weeks)  Date of Return to MD: 5/22/2023  Visit # / Visits authorized: 6 / 9  FOTO: TBA next session    Precautions:  Standard    Time In: 1000  Time Out: 1045  Total Billable Time: 45 minutes     SUBJECTIVE     Pt reports: She reports here pain has significantly decreased.  She was compliant with home exercise program given last session.   Response to previous treatment: Good  Functional change: increased range of motion    Pain: 3/10  Location: bilateral hands/wrists    OBJECTIVE   Objective Measures updated at progress report unless specified.    Observation/Appearance: Normal appearance, skin warm and dry.      Edema. No significant edema noted.      Elbow and Wrist ROM. WFL - all planes of motion.      Hand ROM. WFL - Pt able to make full composite fists with B/L hands and demonstrates thumb opposition touch to SF DPC.       Strength (Dynamometer) and Pinch Strength (Pinch Gauge)  Measured in pounds to POP.    3/8/2023 3/8/2023     Left Right   Rung II 29 45   Key Pinch 5 9.5   3pt Pinch 4 6.5   2pt Pinch 2 6.5      Sensation. Not formally tested. Grossly intact per report. Pt endorses nocturnal numbness and tingling throughout B/L hands.      Special  Tests  Phalen's Test + R   Tinel's Test + R       Treatment     BANDAR received the treatments listed below:     Supervised modalities after being cleared for contradictions: Paraffin bath - with moist heat pack to B/L hand(s) for 15 minutes to increase blood flow, circulation, pain management, and for tissue elasticity prior to therex.     Therapeutic activities bilaterally to improve functional performance for 15 minutes, including:  -Performed cross friction massage to B/L wrists to decrease edema, improve joint mobility, decrease pain, and promote proper healing via increased circulation.   -TGEs x 10  -Prayer stretch 3 x 30 sec holds  -Finger add/abd for intrinsics excursion x 10 reps   -Median nerve glide x 10 reps   -Wrist dextericiser x 2 min each hand   -wrist wheel 2 min each hand  -pro/sup cones with bilateral hands   -red flex bar smiles, frowns, twists 20 x each  -wrist maze 2 min each hand     Manual Therapy : 15 minutes  -IASTM to bilateral wrists and hands   -joint mobilizations of bilateral wrists  -PROM of bilateral wrists   Patient Education and Home Exercises      Education provided:   - Continue prior HEP provided  - Progress towards goals     Written Home Exercises Provided: Patient instructed to cont prior HEP.  Exercises were reviewed and BANDAR was able to demonstrate them prior to the end of the session.  BANDAR demonstrated fair -good understanding of the HEP provided. See EMR under Patient Instructions for exercises provided during therapy sessions.      ASSESSMENT     Pt had good tolerance to treatment this date. She cont to endorse good adherence to HEP and brace wear resulting in no c/o pain, just soreness. She continues to have nocturnal numbness and tingling in bilateral hands. Pt started therapy with 3/10, but was a 0/10 post treatment.  Will progress as tolerated.     BANDAR is progressing well towards her goals and there are no updates to goals at this time. Pt prognosis is Good.      Pt will continue to benefit from skilled outpatient occupational therapy to address the deficits listed in the problem list on initial evaluation, provide pt/family education and to maximize pt's level of independence in the home and community environment.     Pt's spiritual, cultural and educational needs considered and pt agreeable to plan of care and goals.    Anticipated barriers to occupational therapy: communication    Goals:   The following goals were discussed with the patient and patient is in agreement with them as to be addressed in the treatment plan.   Long Term Goals (LTGs); to be met by discharge.  LTG #1: Pt will report a pain level of 1-3 out of 10 at worst with average functional use of her hands. progressing not met 4/19/2023  LTG #2: Pt will return to prior level of function for IADLs and household management. progressing not met 4/19/2023     Short Term Goals (STGs); to be met within 4 weeks (4/7/23).  STG #1: Pt will report a pain level of 4 out of 10 at worst with average functional use of her hands. progressing not met 4/19/2023  STG #2: Pt will demonstrate independence with issued HEP, brace/orthosis wear, and modalities for pain/symptom management. progressing not met 4/19/2023  STG #3: FOTO to be administered and assessed next session with updates to goals as needed. progressing not met 4/19/2023  STG #4: Pt will increase bilateral  strength by at least 3-5 lbs each needed to open jars and carry groceries. progressing not met 4/19/2023  STG #5: Pt will increase pinch strength (lateral/tripod/tip) by at least 1-3 psi per pinch, each hand needed to open packages. progressing not met 4/19/2023    PLAN     Continue skilled occupational therapy with individualized plan of care focusing on maximizing functional use of patient's bilateral hands.    Updates/Grading for next session: progress as tolerated.      Elisha Matt OTR/L

## 2023-05-10 ENCOUNTER — HOSPITAL ENCOUNTER (OUTPATIENT)
Dept: RADIOLOGY | Facility: HOSPITAL | Age: 48
Discharge: HOME | End: 2023-05-10
Attending: INTERNAL MEDICINE
Payer: MEDICAID

## 2023-05-10 DIAGNOSIS — R92.8 ABNORMAL MAMMOGRAM: ICD-10-CM

## 2023-05-10 PROCEDURE — 77061 BREAST TOMOSYNTHESIS UNI: CPT | Mod: TC,LT

## 2023-05-10 PROCEDURE — 77061 MAMMO DIGITAL DIAGNOSTIC LEFT WITH TOMO: ICD-10-PCS | Mod: 26,LT,, | Performed by: RADIOLOGY

## 2023-05-10 PROCEDURE — 77061 BREAST TOMOSYNTHESIS UNI: CPT | Mod: 26,LT,, | Performed by: RADIOLOGY

## 2023-05-10 PROCEDURE — 77065 DX MAMMO INCL CAD UNI: CPT | Mod: 26,LT,, | Performed by: RADIOLOGY

## 2023-05-10 PROCEDURE — 77065 MAMMO DIGITAL DIAGNOSTIC LEFT WITH TOMO: ICD-10-PCS | Mod: 26,LT,, | Performed by: RADIOLOGY

## 2023-05-26 NOTE — PROGRESS NOTES
Subjective:      Patient ID: Wilmar Holcomb is a 48 y.o. female.    Chief Complaint: Pain of the Left Hand and Pain of the Right Hand    HPI  (Serbian)    She was last seen by me on 12/15/22 for bilateral hand numbness/tingling/pain. 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 She has mucous cyst at DIP joint of right index finger. 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     She was sent to OT after her last visit (did 7 visits) and she was to use wrist braces prn.     She is here for follow up.     She had improvement with OT. She has intermittent numbness and tingling in both hands that is improved. Minimal pain in right hand. Some intermittent pain left wrist (ulnar side) with lifting. Back at [employer] 3 days a week, but not doing much lifting. She rates her pain as a 2 on a scale of 1-10. No OT, surgery, or injections for her hands. Velcro on her braces is worn out.       History reviewed. No pertinent past medical history.      Current Outpatient Medications:     fluticasone propionate (FLONASE) 50 mcg/actuation nasal spray, by Each Nostril route., Disp: , Rfl:     fluticasone propionate (FLONASE) 50 mcg/actuation nasal spray, 1 spray (50 mcg total) by Each Nostril route once daily., Disp: 18.2 mL, Rfl: 0    LINZESS 72 mcg Cap capsule, Take 72 mcg by mouth every morning., Disp: , Rfl:     montelukast (SINGULAIR) 10 mg tablet, Take 10 mg by mouth once daily., Disp: , Rfl:     polyethylene glycol (GLYCOLAX) 17 gram/dose powder, Take 17 g by mouth once daily., Disp: 510 g, Rfl: 11    cetirizine (ZYRTEC) 10 MG tablet, Take 1 tablet (10 mg total) by mouth once daily., Disp: 30 tablet, Rfl: 0    Review of patient's allergies indicates:  No Known Allergies    Review of Systems   Constitutional: Negative for chills, fever, night sweats and weight gain.   Gastrointestinal:  Negative for bowel incontinence, nausea and vomiting.  "  Genitourinary:  Negative for bladder incontinence.   Neurological:  Negative for disturbances in coordination and loss of balance.         Objective:        Ht 5' 5" (1.651 m)   Wt 74 kg (163 lb 1.6 oz)   LMP  (LMP Unknown)   BMI 27.14 kg/m²     Ortho/SPM Exam     LEFT Hand/Wrist Examination:    Observation/Inspection:  Swelling  none    Deformity  none  Discoloration  none     Scars   none    Atrophy  none    HAND/WRIST EXAMINATION:  Finkelstein's Test   Neg  WHAT Test    Neg  Snuff box tenderness   Neg  Hook of Hamate Tenderness  Neg  CMC grind    Neg    Neurovascular Exam:  Digits WWP, brisk CR < 3s throughout  NVI motor/LTS to M/R/U nerves, radial pulse 2+  Tinel's Test - Carpal Tunnel  Neg  Tinel's Test - Cubital Tunnel  Neg  Phalen's Test    positive  Median Nerve Compression Test positive    ROM hand/wrist/elbow full, painless    No tenderness noted at wrist or elbow.         Assessment:       Encounter Diagnoses   Name Primary?    Left wrist pain Yes    Carpal tunnel syndrome on right     Carpal tunnel syndrome on left               Plan:       Wilmar was seen today for pain and pain.    Diagnoses and all orders for this visit:    Left wrist pain    Carpal tunnel syndrome on right    Carpal tunnel syndrome on left      She had improvement with OT. She has intermittent numbness and tingling in both hands that is improved. Minimal pain in right hand. Some intermittent pain left wrist (ulnar side) with lifting.     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 She has mucous cyst at DIP joint of right index finger. EMG showed bilateral right > left median neuropathies at the wrists.     Treatment options reviewed with patient and following plan made:     - Discussed further treatment options including surgery and injections. She wants to hold off on these.   - Continue with wrist braces prn, especially on left with lifting. Given new braces today.   - Continue HEP " from OT.   - Recall for a visit in 3 months. Given Dr. Guillen's card as I will be gone.     Follow up if symptoms worsen or fail to improve.

## 2023-05-29 ENCOUNTER — OFFICE VISIT (OUTPATIENT)
Dept: ORTHOPEDICS | Facility: CLINIC | Age: 48
End: 2023-05-29
Payer: MEDICAID

## 2023-05-29 VITALS — BODY MASS INDEX: 27.18 KG/M2 | HEIGHT: 65 IN | WEIGHT: 163.13 LBS

## 2023-05-29 DIAGNOSIS — G56.01 CARPAL TUNNEL SYNDROME ON RIGHT: ICD-10-CM

## 2023-05-29 DIAGNOSIS — G56.02 CARPAL TUNNEL SYNDROME ON LEFT: ICD-10-CM

## 2023-05-29 DIAGNOSIS — M25.532 LEFT WRIST PAIN: Primary | ICD-10-CM

## 2023-05-29 PROCEDURE — 1159F PR MEDICATION LIST DOCUMENTED IN MEDICAL RECORD: ICD-10-PCS | Mod: CPTII,,, | Performed by: PHYSICIAN ASSISTANT

## 2023-05-29 PROCEDURE — 3008F BODY MASS INDEX DOCD: CPT | Mod: CPTII,,, | Performed by: PHYSICIAN ASSISTANT

## 2023-05-29 PROCEDURE — 1160F PR REVIEW ALL MEDS BY PRESCRIBER/CLIN PHARMACIST DOCUMENTED: ICD-10-PCS | Mod: CPTII,,, | Performed by: PHYSICIAN ASSISTANT

## 2023-05-29 PROCEDURE — 99213 PR OFFICE/OUTPT VISIT, EST, LEVL III, 20-29 MIN: ICD-10-PCS | Mod: S$PBB,,, | Performed by: PHYSICIAN ASSISTANT

## 2023-05-29 PROCEDURE — 1160F RVW MEDS BY RX/DR IN RCRD: CPT | Mod: CPTII,,, | Performed by: PHYSICIAN ASSISTANT

## 2023-05-29 PROCEDURE — 3008F PR BODY MASS INDEX (BMI) DOCUMENTED: ICD-10-PCS | Mod: CPTII,,, | Performed by: PHYSICIAN ASSISTANT

## 2023-05-29 PROCEDURE — 99999 PR PBB SHADOW E&M-EST. PATIENT-LVL III: ICD-10-PCS | Mod: PBBFAC,,, | Performed by: PHYSICIAN ASSISTANT

## 2023-05-29 PROCEDURE — 99999 PR PBB SHADOW E&M-EST. PATIENT-LVL III: CPT | Mod: PBBFAC,,, | Performed by: PHYSICIAN ASSISTANT

## 2023-05-29 PROCEDURE — 99213 OFFICE O/P EST LOW 20 MIN: CPT | Mod: S$PBB,,, | Performed by: PHYSICIAN ASSISTANT

## 2023-05-29 PROCEDURE — 99213 OFFICE O/P EST LOW 20 MIN: CPT | Mod: PBBFAC,PN | Performed by: PHYSICIAN ASSISTANT

## 2023-05-29 PROCEDURE — 1159F MED LIST DOCD IN RCRD: CPT | Mod: CPTII,,, | Performed by: PHYSICIAN ASSISTANT

## 2023-06-16 ENCOUNTER — PATIENT MESSAGE (OUTPATIENT)
Dept: PODIATRY | Facility: CLINIC | Age: 48
End: 2023-06-16

## 2023-10-17 ENCOUNTER — OFFICE VISIT (OUTPATIENT)
Dept: OBSTETRICS AND GYNECOLOGY | Facility: CLINIC | Age: 48
End: 2023-10-17

## 2023-10-17 VITALS
SYSTOLIC BLOOD PRESSURE: 122 MMHG | DIASTOLIC BLOOD PRESSURE: 70 MMHG | BODY MASS INDEX: 28.1 KG/M2 | HEIGHT: 65 IN | WEIGHT: 168.63 LBS

## 2023-10-17 DIAGNOSIS — Z01.419 WELL WOMAN EXAM WITH ROUTINE GYNECOLOGICAL EXAM: Primary | ICD-10-CM

## 2023-10-17 PROCEDURE — 99213 OFFICE O/P EST LOW 20 MIN: CPT | Mod: PBBFAC,PO | Performed by: OBSTETRICS & GYNECOLOGY

## 2023-10-17 PROCEDURE — 99386 PREV VISIT NEW AGE 40-64: CPT | Mod: S$PBB,,, | Performed by: OBSTETRICS & GYNECOLOGY

## 2023-10-17 PROCEDURE — 99386 PR PREVENTIVE VISIT,NEW,40-64: ICD-10-PCS | Mod: S$PBB,,, | Performed by: OBSTETRICS & GYNECOLOGY

## 2023-10-17 PROCEDURE — 88175 CYTOPATH C/V AUTO FLUID REDO: CPT | Performed by: PATHOLOGY

## 2023-10-17 PROCEDURE — 99999 PR PBB SHADOW E&M-EST. PATIENT-LVL III: ICD-10-PCS | Mod: PBBFAC,,, | Performed by: OBSTETRICS & GYNECOLOGY

## 2023-10-17 PROCEDURE — 88141 PR  CYTOPATH CERV/VAG INTERPRET: ICD-10-PCS | Mod: ,,, | Performed by: PATHOLOGY

## 2023-10-17 PROCEDURE — 99999 PR PBB SHADOW E&M-EST. PATIENT-LVL III: CPT | Mod: PBBFAC,,, | Performed by: OBSTETRICS & GYNECOLOGY

## 2023-10-17 PROCEDURE — 88141 CYTOPATH C/V INTERPRET: CPT | Mod: ,,, | Performed by: PATHOLOGY

## 2023-10-17 PROCEDURE — 87624 HPV HI-RISK TYP POOLED RSLT: CPT | Performed by: OBSTETRICS & GYNECOLOGY

## 2023-10-17 RX ORDER — ESCITALOPRAM OXALATE 10 MG/1
10 TABLET ORAL
COMMUNITY
Start: 2023-05-20

## 2023-10-17 RX ORDER — MELOXICAM 15 MG/1
TABLET ORAL
COMMUNITY
Start: 2023-05-09

## 2023-10-17 NOTE — PROGRESS NOTES
"HPI:   48 y.o.   OB History          5    Para   3    Term   3            AB   2    Living   3         SAB   2    IAB        Ectopic        Multiple        Live Births                  No LMP recorded (lmp unknown). Patient is postmenopausal.    Patient is  here for her annual gynecologic exam.  She has no complaints.     ROS:  GENERAL: No fever, chills, fatigability or weight loss.  SKIN: No rashes, itching or changes in color or texture of skin.  HEAD: No headaches or recent head trauma.  EYES: Visual acuity fine. No photophobia, ocular pain or diplopia.  EARS: Denies ear pain, discharge or vertigo.  NOSE: No loss of smell, no epistaxis or postnasal drip.  MOUTH & THROAT: No hoarseness or change in voice. No excessive gum bleeding.  NODES: Denies swollen glands.  CHEST: Denies GARCIA, cyanosis, wheezing, cough and sputum production.  CARDIOVASCULAR: Denies chest pain, PND, orthopnea or reduced exercise tolerance.  ABDOMEN: Appetite fine. No weight loss. Denies diarrhea, abdominal pain, hematemesis or blood in stool.  URINARY: No flank pain, dysuria or hematuria.  PERIPHERAL VASCULAR: No claudication or cyanosis.  MUSCULOSKELETAL: No joint stiffness or swelling. Denies back pain.  NEUROLOGIC: No history of seizures, paralysis, alteration of gait or coordination.    PE:   /70   Ht 5' 5" (1.651 m)   Wt 76.5 kg (168 lb 9.6 oz)   LMP  (LMP Unknown)   BMI 28.06 kg/m²   APPEARANCE: Well nourished, well developed, in no acute distress.  NECK: Neck symmetric without masses or thyromegaly.  BREASTS: Symmetrical, no skin changes or visible lesions. No palpable masses, nipple discharge or adenopathy bilaterally.  ABDOMEN: Flat. Soft. No tenderness or masses. No hepatosplenomegaly. No hernias. No CVA tenderness.  VULVA: No lesions. Normal female genitalia.  URETHRAL MEATUS: Normal size and location, no lesions, no prolapse.  URETHRA: No masses, tenderness, prolapse or scarring.  VAGINA: Moist and well " rugated, no discharge, no significant cystocele or rectocele.  CERVIX: No lesions and discharge. PAP done.  UTERUS: Normal size, regular shape, mobile, non-tender, bladder base nontender.  ADNEXA: No masses, tenderness or CDS nodularity.  ANUS PERINEUM: Normal.    PROCEDURES:  Pap smear    Assessment:  Normal Gynecologic Exam    Plan:  Mammogram and Colonoscopy if indicated by current recommendations.  Return to clinic in one year or for any problems or complaints.  No gyn co  Had mammo

## 2023-10-23 ENCOUNTER — PATIENT MESSAGE (OUTPATIENT)
Dept: OBSTETRICS AND GYNECOLOGY | Facility: CLINIC | Age: 48
End: 2023-10-23

## 2023-10-23 ENCOUNTER — TELEPHONE (OUTPATIENT)
Dept: OBSTETRICS AND GYNECOLOGY | Facility: CLINIC | Age: 48
End: 2023-10-23

## 2023-10-23 LAB
FINAL PATHOLOGIC DIAGNOSIS: ABNORMAL
Lab: ABNORMAL

## 2023-10-25 LAB
HPV HR 12 DNA SPEC QL NAA+PROBE: NEGATIVE
HPV16 AG SPEC QL: NEGATIVE
HPV18 DNA SPEC QL NAA+PROBE: NEGATIVE

## 2023-10-27 ENCOUNTER — TELEPHONE (OUTPATIENT)
Dept: OBSTETRICS AND GYNECOLOGY | Facility: CLINIC | Age: 48
End: 2023-10-27

## 2023-10-27 ENCOUNTER — PATIENT MESSAGE (OUTPATIENT)
Dept: OBSTETRICS AND GYNECOLOGY | Facility: CLINIC | Age: 48
End: 2023-10-27

## 2024-04-18 ENCOUNTER — TELEPHONE (OUTPATIENT)
Dept: OBSTETRICS AND GYNECOLOGY | Facility: CLINIC | Age: 49
End: 2024-04-18
Payer: COMMERCIAL

## 2024-04-18 ENCOUNTER — LAB VISIT (OUTPATIENT)
Dept: LAB | Facility: HOSPITAL | Age: 49
End: 2024-04-18
Attending: INTERNAL MEDICINE
Payer: COMMERCIAL

## 2024-04-18 DIAGNOSIS — Z12.31 BREAST CANCER SCREENING BY MAMMOGRAM: Primary | ICD-10-CM

## 2024-04-18 DIAGNOSIS — R73.9 BLOOD GLUCOSE ELEVATED: ICD-10-CM

## 2024-04-18 DIAGNOSIS — Z00.00 ROUTINE GENERAL MEDICAL EXAMINATION AT A HEALTH CARE FACILITY: ICD-10-CM

## 2024-04-18 DIAGNOSIS — Z12.31 SCREENING MAMMOGRAM FOR BREAST CANCER: Primary | ICD-10-CM

## 2024-04-18 LAB
ALBUMIN SERPL BCP-MCNC: 3.9 G/DL (ref 3.5–5.2)
ALP SERPL-CCNC: 73 U/L (ref 55–135)
ALT SERPL W/O P-5'-P-CCNC: 17 U/L (ref 10–44)
ANION GAP SERPL CALC-SCNC: 10 MMOL/L (ref 8–16)
AST SERPL-CCNC: 18 U/L (ref 10–40)
BASOPHILS # BLD AUTO: 0.04 K/UL (ref 0–0.2)
BASOPHILS NFR BLD: 0.7 % (ref 0–1.9)
BILIRUB SERPL-MCNC: 0.2 MG/DL (ref 0.1–1)
BUN SERPL-MCNC: 10 MG/DL (ref 6–20)
CALCIUM SERPL-MCNC: 9.3 MG/DL (ref 8.7–10.5)
CHLORIDE SERPL-SCNC: 107 MMOL/L (ref 95–110)
CO2 SERPL-SCNC: 25 MMOL/L (ref 23–29)
CREAT SERPL-MCNC: 0.6 MG/DL (ref 0.5–1.4)
DIFFERENTIAL METHOD BLD: ABNORMAL
EOSINOPHIL # BLD AUTO: 0.1 K/UL (ref 0–0.5)
EOSINOPHIL NFR BLD: 1.4 % (ref 0–8)
ERYTHROCYTE [DISTWIDTH] IN BLOOD BY AUTOMATED COUNT: 12.2 % (ref 11.5–14.5)
EST. GFR  (NO RACE VARIABLE): >60 ML/MIN/1.73 M^2
ESTIMATED AVG GLUCOSE: 108 MG/DL (ref 68–131)
GLUCOSE SERPL-MCNC: 98 MG/DL (ref 70–110)
HBA1C MFR BLD: 5.4 % (ref 4–5.6)
HCT VFR BLD AUTO: 39.2 % (ref 37–48.5)
HGB BLD-MCNC: 13.2 G/DL (ref 12–16)
IMM GRANULOCYTES # BLD AUTO: 0.02 K/UL (ref 0–0.04)
IMM GRANULOCYTES NFR BLD AUTO: 0.4 % (ref 0–0.5)
LYMPHOCYTES # BLD AUTO: 2.9 K/UL (ref 1–4.8)
LYMPHOCYTES NFR BLD: 52.6 % (ref 18–48)
MCH RBC QN AUTO: 29.7 PG (ref 27–31)
MCHC RBC AUTO-ENTMCNC: 33.7 G/DL (ref 32–36)
MCV RBC AUTO: 88 FL (ref 82–98)
MONOCYTES # BLD AUTO: 0.4 K/UL (ref 0.3–1)
MONOCYTES NFR BLD: 6.7 % (ref 4–15)
NEUTROPHILS # BLD AUTO: 2.1 K/UL (ref 1.8–7.7)
NEUTROPHILS NFR BLD: 38.2 % (ref 38–73)
NRBC BLD-RTO: 0 /100 WBC
PLATELET # BLD AUTO: 340 K/UL (ref 150–450)
PMV BLD AUTO: 9.7 FL (ref 9.2–12.9)
POTASSIUM SERPL-SCNC: 4.5 MMOL/L (ref 3.5–5.1)
PROT SERPL-MCNC: 7.2 G/DL (ref 6–8.4)
RBC # BLD AUTO: 4.45 M/UL (ref 4–5.4)
SODIUM SERPL-SCNC: 142 MMOL/L (ref 136–145)
TSH SERPL DL<=0.005 MIU/L-ACNC: 1.95 UIU/ML (ref 0.4–4)
WBC # BLD AUTO: 5.53 K/UL (ref 3.9–12.7)

## 2024-04-18 PROCEDURE — 85025 COMPLETE CBC W/AUTO DIFF WBC: CPT | Performed by: INTERNAL MEDICINE

## 2024-04-18 PROCEDURE — 80053 COMPREHEN METABOLIC PANEL: CPT | Performed by: INTERNAL MEDICINE

## 2024-04-18 PROCEDURE — 83036 HEMOGLOBIN GLYCOSYLATED A1C: CPT | Performed by: INTERNAL MEDICINE

## 2024-04-18 PROCEDURE — 36415 COLL VENOUS BLD VENIPUNCTURE: CPT | Performed by: INTERNAL MEDICINE

## 2024-04-18 PROCEDURE — 84443 ASSAY THYROID STIM HORMONE: CPT | Performed by: INTERNAL MEDICINE

## 2024-04-18 NOTE — TELEPHONE ENCOUNTER
----- Message from Lalita Hinojosa sent at 4/18/2024  2:06 PM CDT -----  Regarding: Pt's  Aurelio called to schedule a follow up appt for the pt for a problem and he would like a call back today which he will discuss with the nurse  Appointment Access Request:    Pt's  Aurelio called to schedule a follow up appt for the pt for a problem and he would like a call back today which he will discuss with the nurse    Mr Carey can be reached at [phone]

## 2024-05-28 ENCOUNTER — OFFICE VISIT (OUTPATIENT)
Dept: OBSTETRICS AND GYNECOLOGY | Facility: CLINIC | Age: 49
End: 2024-05-28
Payer: COMMERCIAL

## 2024-05-28 VITALS
DIASTOLIC BLOOD PRESSURE: 80 MMHG | WEIGHT: 170.31 LBS | BODY MASS INDEX: 28.34 KG/M2 | SYSTOLIC BLOOD PRESSURE: 120 MMHG

## 2024-05-28 DIAGNOSIS — R87.610 ATYPICAL SQUAMOUS CELLS OF UNDETERMINED SIGNIFICANCE (ASC-US) ON CERVICAL PAP SMEAR: Primary | ICD-10-CM

## 2024-05-28 PROCEDURE — 99999 PR PBB SHADOW E&M-EST. PATIENT-LVL III: CPT | Mod: PBBFAC,,, | Performed by: OBSTETRICS & GYNECOLOGY

## 2024-05-28 PROCEDURE — 3074F SYST BP LT 130 MM HG: CPT | Mod: CPTII,S$GLB,, | Performed by: OBSTETRICS & GYNECOLOGY

## 2024-05-28 PROCEDURE — 1159F MED LIST DOCD IN RCRD: CPT | Mod: CPTII,S$GLB,, | Performed by: OBSTETRICS & GYNECOLOGY

## 2024-05-28 PROCEDURE — 88175 CYTOPATH C/V AUTO FLUID REDO: CPT | Performed by: OBSTETRICS & GYNECOLOGY

## 2024-05-28 PROCEDURE — 3079F DIAST BP 80-89 MM HG: CPT | Mod: CPTII,S$GLB,, | Performed by: OBSTETRICS & GYNECOLOGY

## 2024-05-28 PROCEDURE — 3008F BODY MASS INDEX DOCD: CPT | Mod: CPTII,S$GLB,, | Performed by: OBSTETRICS & GYNECOLOGY

## 2024-05-28 PROCEDURE — 99213 OFFICE O/P EST LOW 20 MIN: CPT | Mod: S$GLB,,, | Performed by: OBSTETRICS & GYNECOLOGY

## 2024-05-28 PROCEDURE — 3044F HG A1C LEVEL LT 7.0%: CPT | Mod: CPTII,S$GLB,, | Performed by: OBSTETRICS & GYNECOLOGY

## 2024-05-28 NOTE — PROGRESS NOTES
49 y.o.   OB History          5    Para   3    Term   3            AB   2    Living   3         SAB   2    IAB        Ectopic        Multiple        Live Births                   Comlaining of:rpt pap  No other issues        ROS:  GENERAL: No fever, chills, fatigability or weight loss.  SKIN: No rashes, itching or changes in color or texture of skin.  HEAD: No headaches or recent head trauma.  EYES: Visual acuity fine. No photophobia, ocular pain or diplopia.  EARS: Denies ear pain, discharge or vertigo.  NOSE: No loss of smell, no epistaxis or postnasal drip.  MOUTH & THROAT: No hoarseness or change in voice. No excessive gum bleeding.  NODES: Denies swollen glands.  CHEST: Denies GARCIA, cyanosis, wheezing, cough and sputum production.  CARDIOVASCULAR: Denies chest pain, PND, orthopnea or reduced exercise tolerance.  ABDOMEN: Appetite fine. No weight loss. Denies diarrhea, abdominal pain, hematemesis or blood in stool.  URINARY: No flank pain, dysuria or hematuria.  PERIPHERAL VASCULAR: No claudication or cyanosis.  MUSCULOSKELETAL: No joint stiffness or swelling. Denies back pain.  NEUROLOGIC: No history of seizures, paralysis, alteration of gait or coordination      PE: /80   Wt 77.2 kg (170 lb 4.8 oz)   LMP  (LMP Unknown)   BMI 28.34 kg/m²    Vulva, vag neg  Cx normal  Good pap done    Discussed paps  A/P  Atyp pap  If normal then one year

## 2024-05-30 ENCOUNTER — HOSPITAL ENCOUNTER (OUTPATIENT)
Dept: RADIOLOGY | Facility: HOSPITAL | Age: 49
Discharge: HOME | End: 2024-05-30
Attending: INTERNAL MEDICINE
Payer: COMMERCIAL

## 2024-05-30 DIAGNOSIS — Z12.31 SCREENING MAMMOGRAM FOR BREAST CANCER: ICD-10-CM

## 2024-05-30 PROCEDURE — 77067 SCR MAMMO BI INCL CAD: CPT | Mod: TC

## 2024-05-30 PROCEDURE — 77063 BREAST TOMOSYNTHESIS BI: CPT | Mod: 26,,, | Performed by: RADIOLOGY

## 2024-05-30 PROCEDURE — 77067 SCR MAMMO BI INCL CAD: CPT | Mod: 26,,, | Performed by: RADIOLOGY

## 2024-05-30 PROCEDURE — 77063 BREAST TOMOSYNTHESIS BI: CPT | Mod: TC

## 2024-06-03 LAB
FINAL PATHOLOGIC DIAGNOSIS: NORMAL
Lab: NORMAL